# Patient Record
Sex: MALE | Race: WHITE | NOT HISPANIC OR LATINO | Employment: OTHER | URBAN - METROPOLITAN AREA
[De-identification: names, ages, dates, MRNs, and addresses within clinical notes are randomized per-mention and may not be internally consistent; named-entity substitution may affect disease eponyms.]

---

## 2017-01-27 ENCOUNTER — OFFICE VISIT (OUTPATIENT)
Dept: DERMATOLOGY | Facility: CLINIC | Age: 82
End: 2017-01-27
Payer: MEDICARE

## 2017-01-27 VITALS — BODY MASS INDEX: 30.92 KG/M2 | WEIGHT: 216 LBS | HEIGHT: 70 IN

## 2017-01-27 DIAGNOSIS — L21.9 SEBORRHEIC DERMATITIS: ICD-10-CM

## 2017-01-27 DIAGNOSIS — L72.0 MILIA: ICD-10-CM

## 2017-01-27 DIAGNOSIS — Z85.820 HISTORY OF MELANOMA: ICD-10-CM

## 2017-01-27 DIAGNOSIS — L57.0 ACTINIC KERATOSES: Primary | ICD-10-CM

## 2017-01-27 DIAGNOSIS — L82.1 SEBORRHEIC KERATOSES: ICD-10-CM

## 2017-01-27 DIAGNOSIS — Z85.828 HISTORY OF SKIN CANCER: ICD-10-CM

## 2017-01-27 DIAGNOSIS — L21.9 SEBORRHEIC DERMATITIS OF SCALP: ICD-10-CM

## 2017-01-27 PROCEDURE — 99999 PR PBB SHADOW E&M-EST. PATIENT-LVL II: CPT | Mod: PBBFAC,,, | Performed by: DERMATOLOGY

## 2017-01-27 PROCEDURE — 1159F MED LIST DOCD IN RCRD: CPT | Mod: S$GLB,,, | Performed by: DERMATOLOGY

## 2017-01-27 PROCEDURE — 1160F RVW MEDS BY RX/DR IN RCRD: CPT | Mod: S$GLB,,, | Performed by: DERMATOLOGY

## 2017-01-27 PROCEDURE — 99214 OFFICE O/P EST MOD 30 MIN: CPT | Mod: 25,S$GLB,, | Performed by: DERMATOLOGY

## 2017-01-27 PROCEDURE — 1126F AMNT PAIN NOTED NONE PRSNT: CPT | Mod: S$GLB,,, | Performed by: DERMATOLOGY

## 2017-01-27 PROCEDURE — 17000 DESTRUCT PREMALG LESION: CPT | Mod: S$GLB,,, | Performed by: DERMATOLOGY

## 2017-01-27 PROCEDURE — 17003 DESTRUCT PREMALG LES 2-14: CPT | Mod: S$GLB,,, | Performed by: DERMATOLOGY

## 2017-01-27 PROCEDURE — 1157F ADVNC CARE PLAN IN RCRD: CPT | Mod: S$GLB,,, | Performed by: DERMATOLOGY

## 2017-01-27 RX ORDER — KETOCONAZOLE 20 MG/G
CREAM TOPICAL
Qty: 60 G | Refills: 3 | Status: SHIPPED | OUTPATIENT
Start: 2017-01-27

## 2017-01-27 RX ORDER — HYDROCORTISONE 25 MG/G
CREAM TOPICAL
Qty: 28 G | Refills: 1 | Status: SHIPPED | OUTPATIENT
Start: 2017-01-27 | End: 2017-08-17

## 2017-01-27 NOTE — PROGRESS NOTES
Subjective:       Patient ID:  Rafa Tompkins is a 81 y.o. male who presents for   Chief Complaint   Patient presents with    Spot     face and head, tender to touch, 6 mo, tx retin A, 0 progress    Skin Check     FBSE     HPI Comments: Patient last seen 7/2016  Here for FU skin check and new complaints  This is a high risk patient here to check for the development of new lesions.    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  Improvement on treatment: no relief        Review of Systems   Constitutional: Negative for fever and chills.   Skin: Positive for activity-related sunscreen use and wears hat. Negative for daily sunscreen use and recent sunburn.        Objective:    Physical Exam   Constitutional: He appears well-developed and well-nourished. No distress.   Neurological: He is alert and oriented to person, place, and time. He is not disoriented.   Psychiatric: He has a normal mood and affect.   Skin:   Areas Examined (abnormalities noted in diagram):   Scalp / Hair Palpated and Inspected  Head / Face Inspection Performed  Neck Inspection Performed  Chest / Axilla Inspection Performed  Abdomen Inspection Performed  Genitals / Buttocks / Groin Inspection Performed  Back Inspection Performed  RUE Inspected  LUE Inspection Performed  RLE Inspected  LLE Inspection Performed  Nails and Digits Inspection Performed                       Diagram Legend     Erythematous scaling macule/papule c/w actinic keratosis       Vascular papule c/w angioma      Pigmented  verrucoid papule/plaque c/w seborrheic keratosis      Yellow umbilicated papule c/w sebaceous hyperplasia      Irregularly shaped tan macule c/w lentigo     1-2 mm smooth white papules consistent with Milia      Movable subcutaneous cyst with punctum c/w epidermal inclusion cyst      Subcutaneous movable cyst c/w pilar cyst      Firm pink to brown papule c/w dermatofibroma      Pedunculated fleshy papule(s) c/w skin tag(s)      Evenly pigmented macule c/w junctional nevus     Mildly variegated pigmented, slightly irregular-bordered macule c/w mildly atypical nevus      Flesh colored to evenly pigmented papule c/w intradermal nevus       Pink pearly papule/plaque c/w basal cell carcinoma      Erythematous hyperkeratotic cursted plaque c/w SCC      Surgical scar with no sign of skin cancer recurrence      Open and closed comedones      Inflammatory papules and pustules      Verrucoid papule consistent consistent with wart     Erythematous eczematous patches and plaques     Dystrophic onycholytic nail with subungual debris c/w onychomycosis     Umbilicated papule    Erythematous-base heme-crusted tan verrucoid plaque consistent with inflamed seborrheic keratosis     Erythematous Silvery Scaling Plaque c/w Psoriasis     See annotation      Assessment / Plan:        Actinic keratoses  Cryosurgery Procedure Note    Verbal consent from the patient is obtained and the patient is aware of the precancerous quality and need for treatment of these lesions. Liquid nitrogen cryosurgery is applied to the 5 actinic keratoses, as detailed in the physical exam, to produce a freeze injury. The patient is aware that blisters may form and is instructed on wound care with gentle cleansing and use of vaseline ointment to keep moist until healed. The patient is supplied a handout on cryosurgery and is instructed to call if lesions do not completely resolve.    Seborrheic keratoses  These are benign inherited growths without a malignant  potential. Reassurance given to patient. No treatment is necessary.     History of non melanoma skin cancer and melanoma  Area of previous melanoma examined. Site well healed with no signs of recurrence.  Total body skin examination performed today including at least 12 points as noted in physical examination. No lesions suspicious for malignancy noted.    Milia, vs giant closed comedones  Linked to testosterone therapy?    Seborrheic dermatitis of scalp, face  Add keto and HC 2.5% cream  Resume keto shampoo BIW    Solar lentigo  This is a benign hyperpigmented sun induced lesion. Daily sun protection will reduce the number of new lesions. Treatment of these benign lesions are considered cosmetic.           Return in about 6 months (around 7/27/2017).

## 2017-08-16 ENCOUNTER — TELEPHONE (OUTPATIENT)
Dept: DERMATOLOGY | Facility: CLINIC | Age: 82
End: 2017-08-16

## 2017-08-16 NOTE — TELEPHONE ENCOUNTER
----- Message from Susie Azar sent at 8/16/2017  3:30 PM CDT -----  Contact: [phone] or [phone]   Patient is requesting a call back from the nurse stated he need to be seen sooner than the appt scheduled in October.    Please call the patient upon request at phone number [phone] or [phone] .

## 2017-08-17 ENCOUNTER — OFFICE VISIT (OUTPATIENT)
Dept: DERMATOLOGY | Facility: CLINIC | Age: 82
End: 2017-08-17
Payer: MEDICARE

## 2017-08-17 VITALS — BODY MASS INDEX: 30.92 KG/M2 | WEIGHT: 216 LBS | HEIGHT: 70 IN

## 2017-08-17 DIAGNOSIS — Z85.828 HISTORY OF SKIN CANCER: ICD-10-CM

## 2017-08-17 DIAGNOSIS — L57.0 ACTINIC KERATOSES: Primary | ICD-10-CM

## 2017-08-17 DIAGNOSIS — Z85.820 HISTORY OF MELANOMA: ICD-10-CM

## 2017-08-17 DIAGNOSIS — L21.9 SEBORRHEIC DERMATITIS: ICD-10-CM

## 2017-08-17 DIAGNOSIS — L82.1 SEBORRHEIC KERATOSES: ICD-10-CM

## 2017-08-17 PROCEDURE — 17003 DESTRUCT PREMALG LES 2-14: CPT | Mod: S$GLB,,, | Performed by: DERMATOLOGY

## 2017-08-17 PROCEDURE — 17000 DESTRUCT PREMALG LESION: CPT | Mod: S$GLB,,, | Performed by: DERMATOLOGY

## 2017-08-17 PROCEDURE — 3008F BODY MASS INDEX DOCD: CPT | Mod: S$GLB,,, | Performed by: DERMATOLOGY

## 2017-08-17 PROCEDURE — 1126F AMNT PAIN NOTED NONE PRSNT: CPT | Mod: S$GLB,,, | Performed by: DERMATOLOGY

## 2017-08-17 PROCEDURE — 99999 PR PBB SHADOW E&M-EST. PATIENT-LVL II: CPT | Mod: PBBFAC,,, | Performed by: DERMATOLOGY

## 2017-08-17 PROCEDURE — 1159F MED LIST DOCD IN RCRD: CPT | Mod: S$GLB,,, | Performed by: DERMATOLOGY

## 2017-08-17 PROCEDURE — 99213 OFFICE O/P EST LOW 20 MIN: CPT | Mod: 25,S$GLB,, | Performed by: DERMATOLOGY

## 2017-08-17 RX ORDER — FLUOROURACIL 50 MG/G
CREAM TOPICAL
Qty: 40 G | Refills: 0 | Status: SHIPPED | OUTPATIENT
Start: 2017-08-17 | End: 2018-03-16

## 2017-08-17 RX ORDER — HYDROCORTISONE 25 MG/G
CREAM TOPICAL
Qty: 28 G | Refills: 1 | Status: SHIPPED | OUTPATIENT
Start: 2017-08-17 | End: 2018-03-16

## 2017-08-17 NOTE — PROGRESS NOTES
Subjective:       Patient ID:  Rafa Tompkins is a 82 y.o. male who presents for   Chief Complaint   Patient presents with    Spot     L cheek     Skin Check     UBSE     Patient last seen 01/2017 with AK's treated with cryo  This is a high risk patient here to check for the development of new lesions.    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, neg bx margins, monitoring  H/o:  Melanoma (C43.9) 2013 right side of eye (def treatment [facility] surgery)  Squamous cell carcinoma (C80.1) 2013 right lower leg    SCC (squamous cell carcinoma) (C44.92) 1995 left temple     in youth        Spot  - Initial  Affected locations: left cheek  Duration: 4 months  Signs and Symptoms: raised, brown.  Severity: mild  Timing: constant  Aggravated by: nothing  Relieving factors/Treatments tried: nothing        Review of Systems   Constitutional: Negative for fever, chills, weight loss, weight gain and night sweats.   Skin: Positive for dry skin, activity-related sunscreen use and wears hat. Negative for daily sunscreen use.   Hematologic/Lymphatic: Bruises/bleeds easily.        Objective:    Physical Exam   Constitutional: He appears well-developed and well-nourished. No distress.   Neurological: He is alert and oriented to person, place, and time. He is not disoriented.   Psychiatric: He has a normal mood and affect.   Skin:   Areas Examined (abnormalities noted in diagram):   Scalp / Hair Palpated and Inspected  Head / Face Inspection Performed  Neck Inspection Performed  Chest / Axilla Inspection Performed  Abdomen Inspection Performed  Genitals / Buttocks / Groin Inspection Performed  Back Inspection Performed  RUE Inspected  LUE Inspection Performed  RLE Inspected  LLE Inspection Performed  Nails and Digits Inspection Performed                       Diagram Legend     Erythematous scaling macule/papule c/w actinic keratosis       Vascular  papule c/w angioma      Pigmented verrucoid papule/plaque c/w seborrheic keratosis      Yellow umbilicated papule c/w sebaceous hyperplasia      Irregularly shaped tan macule c/w lentigo     1-2 mm smooth white papules consistent with Milia      Movable subcutaneous cyst with punctum c/w epidermal inclusion cyst      Subcutaneous movable cyst c/w pilar cyst      Firm pink to brown papule c/w dermatofibroma      Pedunculated fleshy papule(s) c/w skin tag(s)      Evenly pigmented macule c/w junctional nevus     Mildly variegated pigmented, slightly irregular-bordered macule c/w mildly atypical nevus      Flesh colored to evenly pigmented papule c/w intradermal nevus       Pink pearly papule/plaque c/w basal cell carcinoma      Erythematous hyperkeratotic cursted plaque c/w SCC      Surgical scar with no sign of skin cancer recurrence      Open and closed comedones      Inflammatory papules and pustules      Verrucoid papule consistent consistent with wart     Erythematous eczematous patches and plaques     Dystrophic onycholytic nail with subungual debris c/w onychomycosis     Umbilicated papule    Erythematous-base heme-crusted tan verrucoid plaque consistent with inflamed seborrheic keratosis     Erythematous Silvery Scaling Plaque c/w Psoriasis     See annotation      Assessment / Plan:        Actinic keratoses  Extensive sun damage superior scalp, discussed field tx vs PDT  Cryosurgery Procedure Note    Verbal consent from the patient is obtained and the patient is aware of the precancerous quality and need for treatment of these lesions. Liquid nitrogen cryosurgery is applied to the 6 actinic keratoses, as detailed in the physical exam, to produce a freeze injury. The patient is aware that blisters may form and is instructed on wound care with gentle cleansing and use of vaseline ointment to keep moist until healed. The patient is supplied a handout on cryosurgery and is instructed to call if lesions do not  completely resolve.    -     fluorouracil (EFUDEX) 5 % cream; Thin film to superior scalp nightly for 4 weeks  Dispense: 40 g; Refill: 0    Seborrheic dermatitis, scalp/face, large ill defined plaque left sideburn/scalp/temple  Treat aggressively for seb derm, consider biopsy to r/o SCCIS if fails to resolve  -     hydrocortisone 2.5 % cream; Thin film to AA on left face BID PRN flare (use with ketoconazole cream)  Dispense: 28 g; Refill: 1    Seborrheic keratoses  These are benign inherited growths without a malignant potential. Reassurance given to patient. No treatment is necessary.    - stable and chronic    History of skin cancer, History of melanoma (2013)  UBSE today, sites examined, no recurrence    Patient instructed in importance in daily sun protection of at least spf 30. Sun avoidance and topical protection discussed.   Recommend Elta MD (physician office) COTZ sensitive (available online) for daily use on face and neck.  Patient encouraged to wear hat for all outdoor exposure.   Also discussed sun protective clothing.             Return in about 3 months (around 11/17/2017).

## 2017-08-17 NOTE — PATIENT INSTRUCTIONS

## 2017-09-12 ENCOUNTER — LAB VISIT (OUTPATIENT)
Dept: LAB | Facility: HOSPITAL | Age: 82
End: 2017-09-12
Attending: SPECIALIST
Payer: MEDICARE

## 2017-09-12 DIAGNOSIS — E29.1 OTHER TESTICULAR HYPOFUNCTION: ICD-10-CM

## 2017-09-12 DIAGNOSIS — E29.1 OTHER TESTICULAR HYPOFUNCTION: Primary | ICD-10-CM

## 2017-09-12 LAB — TESTOST SERPL-MCNC: 332 NG/DL

## 2017-09-12 PROCEDURE — 84402 ASSAY OF FREE TESTOSTERONE: CPT

## 2017-09-12 PROCEDURE — 36415 COLL VENOUS BLD VENIPUNCTURE: CPT | Mod: PO

## 2017-09-12 PROCEDURE — 84403 ASSAY OF TOTAL TESTOSTERONE: CPT

## 2017-09-15 LAB — TESTOST FREE SERPL-MCNC: 4.7 PG/ML

## 2017-11-14 ENCOUNTER — TELEPHONE (OUTPATIENT)
Dept: CARDIOLOGY | Facility: CLINIC | Age: 82
End: 2017-11-14

## 2017-11-14 NOTE — TELEPHONE ENCOUNTER
Spoke to patient's spouse.  Spouse stated that her  is having swelling in his left leg with discoloration.  Also patient is having dyspnea on exertion.   patient was put on the phone. Patient stated that he thinks his wife is exaggerating.  Patient did state that he is having some shortness of breath and one leg is more swollen than the other.  Encouraged patient to go to the Emergency department.  patient stated he did not want to go.  Encouraged patient to at least call his PCP DR Garcia with [facility].    Patient agreed to make an appointment with DR. Olsen to establish cardiac care.  Will mail appointment letter .

## 2017-11-20 ENCOUNTER — TELEPHONE (OUTPATIENT)
Dept: CARDIOLOGY | Facility: CLINIC | Age: 82
End: 2017-11-20

## 2017-11-20 NOTE — TELEPHONE ENCOUNTER
Returned Mr. Tompkins's call to find out what lab work he is talking about. Ne answer, left message. Will continue to call.

## 2017-11-20 NOTE — TELEPHONE ENCOUNTER
----- Message from Kanika Sanchez sent at 11/20/2017  8:08 AM CST -----  Pt is taking coumadin .. Asking if Dr Olsen will be monitoring his labs  / was advised to have a lab on Tuesday 11/21 st / call [phone] (home)

## 2017-11-21 NOTE — TELEPHONE ENCOUNTER
Called Mr. Tompkins, his son asnwered and stated he just left for Spiritism. He stated he would have him call back when he got home. No further issues noted.

## 2017-12-06 ENCOUNTER — TELEPHONE (OUTPATIENT)
Dept: CARDIOLOGY | Facility: CLINIC | Age: 82
End: 2017-12-06

## 2017-12-06 NOTE — TELEPHONE ENCOUNTER
Spoke to patient he stated that Fatuma had returned his call questioning who and what woould he go to for coumadin blood work.  Patient currently with Dr Garcia  In [facility] ER.  Explained to patient that he will need to continue his appointment with Dr Garcia's office.  When patient comes to see Dr Olsen will be referred to the [facility] coumadin clinic.  Patient stated that he understands and agrees.    t

## 2017-12-06 NOTE — TELEPHONE ENCOUNTER
----- Message from Mechelle Correa sent at 12/6/2017  9:08 AM CST -----  Patient states he is returning a call from office please call [phone] (ojrk)

## 2017-12-15 ENCOUNTER — OFFICE VISIT (OUTPATIENT)
Dept: CARDIOLOGY | Facility: CLINIC | Age: 82
End: 2017-12-15
Payer: MEDICARE

## 2017-12-15 VITALS
WEIGHT: 231.06 LBS | HEIGHT: 70 IN | DIASTOLIC BLOOD PRESSURE: 71 MMHG | BODY MASS INDEX: 33.08 KG/M2 | HEART RATE: 64 BPM | OXYGEN SATURATION: 96 % | SYSTOLIC BLOOD PRESSURE: 123 MMHG

## 2017-12-15 DIAGNOSIS — I35.0 NONRHEUMATIC AORTIC VALVE STENOSIS: ICD-10-CM

## 2017-12-15 DIAGNOSIS — Z76.89 ENCOUNTER TO ESTABLISH CARE: ICD-10-CM

## 2017-12-15 DIAGNOSIS — I82.402 ACUTE DEEP VEIN THROMBOSIS (DVT) OF LEFT LOWER EXTREMITY, UNSPECIFIED VEIN: ICD-10-CM

## 2017-12-15 DIAGNOSIS — I26.99 OTHER ACUTE PULMONARY EMBOLISM WITHOUT ACUTE COR PULMONALE: ICD-10-CM

## 2017-12-15 DIAGNOSIS — Z76.89 ENCOUNTER TO ESTABLISH CARE: Primary | ICD-10-CM

## 2017-12-15 PROBLEM — I82.409 DEEP VEIN THROMBOSIS: Status: ACTIVE | Noted: 2017-12-15

## 2017-12-15 PROCEDURE — 99204 OFFICE O/P NEW MOD 45 MIN: CPT | Mod: S$GLB,,, | Performed by: INTERNAL MEDICINE

## 2017-12-15 PROCEDURE — 99999 PR PBB SHADOW E&M-EST. PATIENT-LVL III: CPT | Mod: PBBFAC,,, | Performed by: INTERNAL MEDICINE

## 2017-12-15 PROCEDURE — 93000 ELECTROCARDIOGRAM COMPLETE: CPT | Mod: S$GLB,,, | Performed by: INTERNAL MEDICINE

## 2017-12-15 RX ORDER — WARFARIN SODIUM 5 MG/1
TABLET ORAL
Refills: 0 | COMMUNITY
Start: 2017-11-16 | End: 2018-03-16

## 2017-12-15 RX ORDER — WARFARIN 4 MG/1
4 TABLET ORAL
Refills: 0 | COMMUNITY
Start: 2017-11-21 | End: 2020-01-22

## 2017-12-15 NOTE — PROGRESS NOTES
"[facility]    CC: Shortness of breath  Chief Complaint   Patient presents with    [facility]       Patient ID: Rafa Tompkins is a 82 y.o. male with a past medical history of DVT, pulmonary embolism     HPI  This gentleman was recently admitted to the hospital after a diagnosis of DVT and pulmonary embolism.  He underwent extensive evaluation and was discharged on anticoagulation.  During this hospital stay at [facility] he also had an echocardiography which showed severe aortic stenosis based on mean gradient of 44, aortic valve area of 0.8 cm².  He usually is not short of breath unless he tries to exert himself.. Denies syncope, presyncope.    Past Medical History:   Diagnosis Date    Glaucoma     Hypertension     Melanoma 2013    right side of eye    Melanoma in situ     SCC (squamous cell carcinoma) 1995    left temple    Squamous cell carcinoma 2013    right lower leg     Past Surgical History:   Procedure Laterality Date    HIP SURGERY       Social History     Social History    Marital status:      Spouse name: N/A    Number of children: N/A    Years of education: N/A     Occupational History    Not on file.     Social History Main Topics    Smoking status: Former Smoker     Types: Pipe     Quit date: 1/27/2014    Smokeless tobacco: Never Used    Alcohol use 0.6 oz/week     1 Glasses of wine per week      Comment: 1 drink a day    Drug use: No    Sexual activity: Yes     Partners: Female     Other Topics Concern    Not on file     Social History Narrative    No narrative on file     Family History   Problem Relation Age of Onset    Melanoma Neg Hx     Psoriasis Neg Hx     Lupus Neg Hx     Eczema Neg Hx        Review of patient's allergies indicates:   Allergen Reactions    Penicillins        Medication List with Changes/Refills   Current Medications    BD LUER-GEOFFREY SYRINGE 3 ML 20 X 1" SYRG        CALCITRIOL (ROCALTROL) 0.25 MCG CAP    TK ONE C " "PO  Q WEEK    DOXAZOSIN (CARDURA) 8 MG TABLET    Take 4 mg by mouth every evening.     FINASTERIDE (PROSCAR) 5 MG TABLET        FLUOROURACIL (EFUDEX) 5 % CREAM    Thin film to superior scalp nightly for 4 weeks    HYDROCORTISONE 2.5 % CREAM    Thin film to AA on left face BID PRN flare (use with ketoconazole cream)    KETOCONAZOLE (NIZORAL) 2 % CREAM    Apply to itchy red scaly areas of the face twice daily    KETOCONAZOLE (NIZORAL) 2 % SHAMPOO    Wash hair with medicated shampoo at least 2x/week - let sit on scalp at least 5 minutes prior to rinsing    LISINOPRIL (PRINIVIL,ZESTRIL) 2.5 MG TABLET        SIMVASTATIN (ZOCOR) 40 MG TABLET    Take 40 mg by mouth every evening.      TESTOSTERONE CYPIONATE (DEPOTESTOTERONE CYPIONATE) 200 MG/ML INJECTION        TIMOLOL MALEATE 0.5% (TIMOPTIC) 0.5 % DROP        TRAVOPROST, BENZALKONIUM, (TRAVATAN) 0.004 % OPHTHALMIC SOLUTION    Place 1 drop into both eyes nightly.      TRETINOIN (RETIN-A TOP)    Apply topically.    WARFARIN (COUMADIN) 4 MG TABLET    Take 4 mg by mouth.    WARFARIN (COUMADIN) 5 MG TABLET           Review of Systems  Constitution: Denies chills, fever, and sweats.  HENT: Denies headaches or blurry vision.  Cardiovascular: Denies chest pain or irregular heart beat.  Respiratory: Denies cough or shortness of breath.  Gastrointestinal: Denies abdominal pain, nausea, or vomiting.  Musculoskeletal: Denies muscle cramps.  Neurological: Denies dizziness or focal weakness.  Psychiatric/Behavioral: Normal mental status.  Hematologic/Lymphatic: Denies bleeding problem or easy bruising/bleeding.  Skin: Denies rash or suspicious lesions    Physical Examination  /71 (BP Location: Left arm)   Pulse 64   Ht 5' 10" (1.778 m)   Wt 104.8 kg (231 lb 0.7 oz)   SpO2 96%   BMI 33.15 kg/m²     Constitutional: No acute distress, conversant  HEENT: Sclera anicteric, Pupils equal, round and reactive to light, extraocular motions intact, Oropharynx clear  Neck: No JVD, no " carotid bruits  Cardiovascular: regular rate and rhythm, esm murmur, rubs or gallops, normal S1/S2  Pulmonary: Clear to auscultation bilaterally  Abdominal: Abdomen soft, nontender, nondistended, positive bowel sounds  Extremities: Left extremity edema,   Pulses:  Carotid pulses are 2+ on the right side, and 2+ on the left side.  Radial pulses are 2+ on the right side, and 2+ on the left side.      Skin: No ecchymosis, erythema, or ulcers  Psych: Alert and oriented x 3, appropriate affect  Neuro: CNII-XII intact, no focal deficits    Labs:  Most Recent Data  CBC:   Lab Results   Component Value Date    WBC 7.90 10/12/2014    HGB 15.2 10/12/2014    HCT 44.7 10/12/2014     (L) 10/12/2014    MCV 93 10/12/2014    RDW 14.0 10/12/2014     BMP:   Lab Results   Component Value Date     10/12/2014    K 4.2 10/12/2014     10/12/2014    CO2 20 (L) 10/12/2014    BUN 21 10/12/2014    CREATININE 1.6 (H) 10/12/2014     (H) 10/12/2014    CALCIUM 8.7 10/12/2014    MG 2.2 04/07/2012    PHOS 3.0 04/07/2012     LFTS;   Lab Results   Component Value Date    PROT 6.8 10/12/2014    ALBUMIN 3.4 (L) 10/12/2014    BILITOT 1.1 (H) 10/12/2014    AST 20 10/12/2014    ALKPHOS 65 10/12/2014    ALT 15 10/12/2014     COAGS:   Lab Results   Component Value Date    INR 1.1 03/30/2012     FLP: No results found for: CHOL, HDL, LDLCALC, TRIG, CHOLHDL  CARDIAC:   Lab Results   Component Value Date     (H) 04/07/2012       Imaging:    EKG: Normal sinus rhythm.  inComplete right bundle-branch block     Echo: Mean gradient of 44, valve area 0.8 cm² peak gradient more than 70. normal Ejection fraction      I have personally reviewed these images and echo data    Assessment/Plan:  Rafa was seen today for establish care.    Diagnoses and all orders for this visit:    Encounter to establish care  -     EKG 12-lead    Nonrheumatic aortic valve stenosis    Acute deep vein thrombosis (DVT) of left lower extremity, unspecified  vein    Other acute pulmonary embolism without acute cor pulmonale    Return in about 4 months (around 4/15/2018).    Currently the deep vein thrombosis and the pulmonary embolism is fresh hence I do not want to do any interruption of anticoagulation.  I will see him back in few months time and then schedule him for a left and right heart catheterization for assessment of the aortic stenosis.      Total duration of face to face visit time 45 minutes.  Total time spent counseling greater than fifty percent of total visit time.  Counseling included discussion regarding imaging findings, diagnosis, possibilities, treatment options, risks and benefits.  The patient had many questions regarding the options and long-term effect which were all answered to my best ability.      Sergio Olsen MD,MRCP,RPVI,FACC,FSCAI.  Interventional Cardiology   Phone [phone]

## 2018-03-16 ENCOUNTER — OFFICE VISIT (OUTPATIENT)
Dept: DERMATOLOGY | Facility: CLINIC | Age: 83
End: 2018-03-16
Payer: MEDICARE

## 2018-03-16 VITALS — WEIGHT: 231 LBS | HEIGHT: 70 IN | BODY MASS INDEX: 33.07 KG/M2

## 2018-03-16 DIAGNOSIS — L57.0 ACTINIC KERATOSES: ICD-10-CM

## 2018-03-16 DIAGNOSIS — L72.0 MILIA: ICD-10-CM

## 2018-03-16 DIAGNOSIS — D48.9 NEOPLASM OF UNCERTAIN BEHAVIOR: Primary | ICD-10-CM

## 2018-03-16 DIAGNOSIS — L81.4 SOLAR LENTIGO: ICD-10-CM

## 2018-03-16 DIAGNOSIS — Z85.828 HISTORY OF SKIN CANCER: ICD-10-CM

## 2018-03-16 DIAGNOSIS — L82.1 SEBORRHEIC KERATOSES: ICD-10-CM

## 2018-03-16 PROCEDURE — 99213 OFFICE O/P EST LOW 20 MIN: CPT | Mod: 25,S$GLB,, | Performed by: DERMATOLOGY

## 2018-03-16 PROCEDURE — 88305 TISSUE EXAM BY PATHOLOGIST: CPT | Mod: 59 | Performed by: PATHOLOGY

## 2018-03-16 PROCEDURE — 11101 PR BIOPSY, EACH ADDED LESION: CPT | Mod: S$GLB,,, | Performed by: DERMATOLOGY

## 2018-03-16 PROCEDURE — 11100 PR BIOPSY OF SKIN LESION: CPT | Mod: 59,S$GLB,, | Performed by: DERMATOLOGY

## 2018-03-16 PROCEDURE — 17000 DESTRUCT PREMALG LESION: CPT | Mod: S$GLB,,, | Performed by: DERMATOLOGY

## 2018-03-16 PROCEDURE — 99999 PR PBB SHADOW E&M-EST. PATIENT-LVL III: CPT | Mod: PBBFAC,,, | Performed by: DERMATOLOGY

## 2018-03-16 PROCEDURE — 17003 DESTRUCT PREMALG LES 2-14: CPT | Mod: S$GLB,,, | Performed by: DERMATOLOGY

## 2018-03-16 RX ORDER — WARFARIN 3 MG/1
3 TABLET ORAL
Refills: 0 | COMMUNITY
Start: 2018-02-21 | End: 2018-09-17

## 2018-03-16 NOTE — PATIENT INSTRUCTIONS
Shave Biopsy Wound Care    Your doctor has performed a shave biopsy today.  A band aid and vaseline ointment has been placed over the site.  This should remain in place for 24 hours.  It is recommended that you keep the area dry for the first 24 hours.  After 24 hours, you may remove the band aid and wash the area with warm soap and water and apply Vaseline jelly.  Many patients prefer to use Neosporin or Bacitracin ointment.  This is acceptable; however, know that you can develop an allergy to this medication even if you have used it safely for years.  It is important to keep the area moist.  Letting it dry out and get air slows healing time, and will worsen the scar.  Band aid is optional after first 24 hours.      If you notice increasing redness, tenderness, pain, or yellow drainage at the biopsy site, please notify your doctor.  These are signs of an infection.    If your biopsy site is bleeding, apply firm pressure for 15 minutes straight.  Repeat for another 15 minutes, if it is still bleeding.   If the surgical site continues to bleed, then please contact your doctor.       [facility]  [facility]  [address]  [place] [address]  Dept: [phone]       CRYOSURGERY      Your doctor has used a method called cryosurgery to treat your skin condition. Cryosurgery refers to the use of very cold substances to treat a variety of skin conditions such as warts, pre-skin cancers, molluscum contagiosum, sun spots, and several benign growths. The substance we use in cryosurgery is liquid nitrogen and is so cold (-195 degrees Celsius) that is burns when administered.     Following treatment in the office, the skin may immediately burn and become red. You may find the area around the lesion is affected as well. It is sometimes necessary to treat not only the lesion, but a small area of the surrounding normal skin to achieve a good response.     A blister, and even a blood filled blister,  may form after treatment.   This is a normal response. If the blister is painful, it is acceptable to sterilize a needle and with rubbing alcohol and gently pop the blister. It is important that you gently wash the area with soap and warm water as the blister fluid may contain wart virus if a wart was treated. Do no remove the roof of the blister.     The area treated can take anywhere from 1-3 weeks to heal. Healing time depends on the kind skin lesion treated, the location, and how aggressively the lesion was treated. It is recommended that the areas treated are covered with Vaseline or bacitracin ointment and a band-aid. If a band-aid is not practical, just ointment applied several times per day will do. Keeping these areas moist will speed the healing time.    Treatment with liquid nitrogen can leave a scar. In dark skin, it may be a light or dark scar, in light skin it may be a white or pink scar. These will generally fade with time, but may never go away completely.     If you have any concerns after your treatment, please feel free to call the office.         [facility]  [facility]  [address] [place] [address]  Dept: [phone]

## 2018-03-16 NOTE — PROGRESS NOTES
Subjective:       Patient ID:  Rafa Tompkins is a 83 y.o. male who presents for   Chief Complaint   Patient presents with    Skin Check     UBSE    Lesion     L arm     Patient last seen 8/2017 with AKs treated with cryo  This is a high risk patient here to check for the development of new lesions.    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, neg bx margins, monitoring  H/o:  Melanoma (C43.9) 2013 right side of eye (def treatment [facility] surgery)  Squamous cell carcinoma (C80.1) 2013 right lower leg    SCC (squamous cell carcinoma) (C44.92) 1995 left temple     in youth        Lesion  - Initial  Affected locations: left arm  Duration: 6 months  Signs / symptoms: scaling (raised)  Severity: mild  Timing: constant  Aggravated by: picking  Relieving factors/Treatments tried: OTC wart treatment  Improvement on treatment: no relief        Review of Systems   Constitutional: Negative for fever, chills, weight loss, weight gain, fatigue, night sweats and malaise.   Skin: Positive for dry skin, activity-related sunscreen use and wears hat. Negative for daily sunscreen use.   Hematologic/Lymphatic: Bruises/bleeds easily.        Objective:    Physical Exam   Constitutional: He appears well-developed and well-nourished. No distress.   Neurological: He is alert and oriented to person, place, and time. He is not disoriented.   Psychiatric: He has a normal mood and affect.   Skin:   Areas Examined (abnormalities noted in diagram):   Scalp / Hair Palpated and Inspected  Head / Face Inspection Performed  Neck Inspection Performed  Chest / Axilla Inspection Performed  Abdomen Inspection Performed  Back Inspection Performed  RUE Inspected  LUE Inspection Performed  Nails and Digits Inspection Performed                       Diagram Legend     Erythematous scaling macule/papule c/w actinic keratosis       Vascular papule c/w angioma       Pigmented verrucoid papule/plaque c/w seborrheic keratosis      Yellow umbilicated papule c/w sebaceous hyperplasia      Irregularly shaped tan macule c/w lentigo     1-2 mm smooth white papules consistent with Milia      Movable subcutaneous cyst with punctum c/w epidermal inclusion cyst      Subcutaneous movable cyst c/w pilar cyst      Firm pink to brown papule c/w dermatofibroma      Pedunculated fleshy papule(s) c/w skin tag(s)      Evenly pigmented macule c/w junctional nevus     Mildly variegated pigmented, slightly irregular-bordered macule c/w mildly atypical nevus      Flesh colored to evenly pigmented papule c/w intradermal nevus       Pink pearly papule/plaque c/w basal cell carcinoma      Erythematous hyperkeratotic cursted plaque c/w SCC      Surgical scar with no sign of skin cancer recurrence      Open and closed comedones      Inflammatory papules and pustules      Verrucoid papule consistent consistent with wart     Erythematous eczematous patches and plaques     Dystrophic onycholytic nail with subungual debris c/w onychomycosis     Umbilicated papule    Erythematous-base heme-crusted tan verrucoid plaque consistent with inflamed seborrheic keratosis     Erythematous Silvery Scaling Plaque c/w Psoriasis     See annotation              Assessment / Plan:      Pathology Orders:     Normal Orders This Visit    Tissue Specimen To Pathology, Dermatology     Questions:    Directional Terms:  Other(comment)    Clinical information:  1/2- left neck, inflamed warty papule, ISK vs SCC 2/2- left forearm, tender warty papule, ISK vs SCC    Specific Site:  1/2- left neck 2/2- left forearm,        Neoplasm of uncertain behavior  -     Tissue Specimen To Pathology, Dermatology  Shave biopsy procedure note:x2    Shave biopsy performed after verbal consent including risk of infection, scar, recurrence, need for additional treatment of site. Area prepped with alcohol, anesthetized with approximately 1.0cc of 1%  lidocaine with epinephrine. Lesional tissue shaved with razor blade. Hemostasis achieved with application of aluminum chloride followed by hyfrecation. No complications. Dressing applied. Wound care explained.      Actinic keratoses  Cryosurgery Procedure Note    Verbal consent from the patient is obtained and the patient is aware of the precancerous quality and need for treatment of these lesions. Liquid nitrogen cryosurgery is applied to the 3 actinic keratoses, as detailed in the physical exam, to produce a freeze injury. The patient is aware that blisters may form and is instructed on wound care with gentle cleansing and use of vaseline ointment to keep moist until healed. The patient is supplied a handout on cryosurgery and is instructed to call if lesions do not completely resolve.    Seborrheic keratoses  These are benign inherited growths without a malignant potential. Reassurance given to patient. No treatment is necessary.     Solar lentigo  This is a benign hyperpigmented sun induced lesion. Daily sun protection will reduce the number of new lesions. Treatment of these benign lesions are considered cosmetic.    Milia, vs giant closed comedones  Linked to testosterone therapy?    History of skin cancer and MIS R lateral canthus  Area of previous NMSC (multiple) examined. Site well healed with no signs of recurrence.  Upper body skin examination performed today including at least 9 points as noted in physical examination. No lesions suspicious for malignancy noted.    Patient instructed in importance in daily sun protection of at least spf 30. Sun avoidance and topical protection discussed.   Recommend Elta MD (physician office) COTZ sensitive (available online) for daily use on face and neck.  Patient encouraged to wear hat for all outdoor exposure.   Also discussed sun protective clothing.           No Follow-up on file.

## 2018-09-17 ENCOUNTER — OFFICE VISIT (OUTPATIENT)
Dept: DERMATOLOGY | Facility: CLINIC | Age: 83
End: 2018-09-17
Payer: MEDICARE

## 2018-09-17 DIAGNOSIS — L81.4 SOLAR LENTIGO: ICD-10-CM

## 2018-09-17 DIAGNOSIS — L82.1 SEBORRHEIC KERATOSES: ICD-10-CM

## 2018-09-17 DIAGNOSIS — D48.9 NEOPLASM OF UNCERTAIN BEHAVIOR: Primary | ICD-10-CM

## 2018-09-17 DIAGNOSIS — L57.0 ACTINIC KERATOSES: ICD-10-CM

## 2018-09-17 DIAGNOSIS — Z85.828 HISTORY OF NONMELANOMA SKIN CANCER: ICD-10-CM

## 2018-09-17 DIAGNOSIS — L72.0 MILIA: ICD-10-CM

## 2018-09-17 DIAGNOSIS — Z85.820 HISTORY OF MELANOMA: ICD-10-CM

## 2018-09-17 PROCEDURE — 1101F PT FALLS ASSESS-DOCD LE1/YR: CPT | Mod: CPTII,,, | Performed by: DERMATOLOGY

## 2018-09-17 PROCEDURE — 88342 IMHCHEM/IMCYTCHM 1ST ANTB: CPT | Performed by: PATHOLOGY

## 2018-09-17 PROCEDURE — 11101 PR BIOPSY, EACH ADDED LESION: CPT | Mod: PBBFAC,PO | Performed by: DERMATOLOGY

## 2018-09-17 PROCEDURE — 17003 DESTRUCT PREMALG LES 2-14: CPT | Mod: PBBFAC,PO | Performed by: DERMATOLOGY

## 2018-09-17 PROCEDURE — 88305 TISSUE EXAM BY PATHOLOGIST: CPT | Mod: 59 | Performed by: PATHOLOGY

## 2018-09-17 PROCEDURE — 99999 PR PBB SHADOW E&M-EST. PATIENT-LVL III: CPT | Mod: PBBFAC,,, | Performed by: DERMATOLOGY

## 2018-09-17 PROCEDURE — 11100 PR BIOPSY OF SKIN LESION: CPT | Mod: 59,PBBFAC,PO | Performed by: DERMATOLOGY

## 2018-09-17 PROCEDURE — 11101 PR BIOPSY, EACH ADDED LESION: CPT | Mod: S$PBB,,, | Performed by: DERMATOLOGY

## 2018-09-17 PROCEDURE — 99213 OFFICE O/P EST LOW 20 MIN: CPT | Mod: 25,S$PBB,, | Performed by: DERMATOLOGY

## 2018-09-17 PROCEDURE — 88341 IMHCHEM/IMCYTCHM EA ADD ANTB: CPT | Mod: 26,,, | Performed by: PATHOLOGY

## 2018-09-17 PROCEDURE — 88305 TISSUE EXAM BY PATHOLOGIST: CPT | Mod: 26,,, | Performed by: PATHOLOGY

## 2018-09-17 PROCEDURE — 11100 PR BIOPSY OF SKIN LESION: CPT | Mod: 59,S$PBB,, | Performed by: DERMATOLOGY

## 2018-09-17 PROCEDURE — 17000 DESTRUCT PREMALG LESION: CPT | Mod: PBBFAC,PO | Performed by: DERMATOLOGY

## 2018-09-17 PROCEDURE — 99213 OFFICE O/P EST LOW 20 MIN: CPT | Mod: PBBFAC,PO,25 | Performed by: DERMATOLOGY

## 2018-09-17 PROCEDURE — 17000 DESTRUCT PREMALG LESION: CPT | Mod: S$PBB,,, | Performed by: DERMATOLOGY

## 2018-09-17 PROCEDURE — 88342 IMHCHEM/IMCYTCHM 1ST ANTB: CPT | Mod: 26,,, | Performed by: PATHOLOGY

## 2018-09-17 PROCEDURE — 17003 DESTRUCT PREMALG LES 2-14: CPT | Mod: S$PBB,,, | Performed by: DERMATOLOGY

## 2018-09-17 NOTE — PATIENT INSTRUCTIONS
Shave Biopsy Wound Care    Your doctor has performed a shave biopsy today.  A band aid and vaseline ointment has been placed over the site.  This should remain in place for 24 hours.  It is recommended that you keep the area dry for the first 24 hours.  After 24 hours, you may remove the band aid and wash the area with warm soap and water and apply Vaseline jelly.  Many patients prefer to use Neosporin or Bacitracin ointment.  This is acceptable; however, know that you can develop an allergy to this medication even if you have used it safely for years.  It is important to keep the area moist.  Letting it dry out and get air slows healing time, and will worsen the scar.  Band aid is optional after first 24 hours.      If you notice increasing redness, tenderness, pain, or yellow drainage at the biopsy site, please notify your doctor.  These are signs of an infection.    If your biopsy site is bleeding, apply firm pressure for 15 minutes straight.  Repeat for another 15 minutes, if it is still bleeding.   If the surgical site continues to bleed, then please contact your doctor.       [facility]  [facility]  [address]  [place] [address]  Dept: [phone]       CRYOSURGERY      Your doctor has used a method called cryosurgery to treat your skin condition. Cryosurgery refers to the use of very cold substances to treat a variety of skin conditions such as warts, pre-skin cancers, molluscum contagiosum, sun spots, and several benign growths. The substance we use in cryosurgery is liquid nitrogen and is so cold (-195 degrees Celsius) that is burns when administered.     Following treatment in the office, the skin may immediately burn and become red. You may find the area around the lesion is affected as well. It is sometimes necessary to treat not only the lesion, but a small area of the surrounding normal skin to achieve a good response.     A blister, and even a blood filled blister,  may form after treatment.   This is a normal response. If the blister is painful, it is acceptable to sterilize a needle and with rubbing alcohol and gently pop the blister. It is important that you gently wash the area with soap and warm water as the blister fluid may contain wart virus if a wart was treated. Do no remove the roof of the blister.     The area treated can take anywhere from 1-3 weeks to heal. Healing time depends on the kind skin lesion treated, the location, and how aggressively the lesion was treated. It is recommended that the areas treated are covered with Vaseline or bacitracin ointment and a band-aid. If a band-aid is not practical, just ointment applied several times per day will do. Keeping these areas moist will speed the healing time.    Treatment with liquid nitrogen can leave a scar. In dark skin, it may be a light or dark scar, in light skin it may be a white or pink scar. These will generally fade with time, but may never go away completely.     If you have any concerns after your treatment, please feel free to call the office.         [facility]  [facility]  [address] [place] [address]  Dept: [phone]

## 2018-09-17 NOTE — PROGRESS NOTES
"  Subjective:       Patient ID:  Rafa Tompkins is a 83 y.o. male who presents for   Chief Complaint   Patient presents with    Skin Check     6 month UBSE    Spot     L cheek     Patient last seen 03/2018 with AKs treated with cryo and bx of small SCCIS left neck, neg biopsy margins, monitoring    This is a high risk patient here to check for the development of new lesions.  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, neg bx margins, monitoring  Melanoma (C43.9) 2013 right side of eye (def treatment [facility] surgery)  Squamous cell carcinoma (C80.1) 2013 right lower leg    SCC (squamous cell carcinoma) (C44.92) 1995 left temple     in youth      Current Outpatient Medications:     BD LUER-GEOFFREY SYRINGE 3 mL 20 x 1" Syrg, , Disp: , Rfl: 5    calcitRIOL (ROCALTROL) 0.25 MCG Cap, TK ONE C PO  Q WEEK, Disp: , Rfl: 4    finasteride (PROSCAR) 5 mg tablet, , Disp: , Rfl: 2    ketoconazole (NIZORAL) 2 % cream, Apply to itchy red scaly areas of the face twice daily, Disp: 60 g, Rfl: 3    ketoconazole (NIZORAL) 2 % shampoo, Wash hair with medicated shampoo at least 2x/week - let sit on scalp at least 5 minutes prior to rinsing, Disp: 120 mL, Rfl: 5    lisinopril (PRINIVIL,ZESTRIL) 2.5 MG tablet, , Disp: , Rfl: 3    simvastatin (ZOCOR) 40 MG tablet, Take 40 mg by mouth every evening.  , Disp: , Rfl:     testosterone cypionate (DEPOTESTOTERONE CYPIONATE) 200 mg/mL injection, every 28 days. , Disp: , Rfl: 2    timolol maleate 0.5% (TIMOPTIC) 0.5 % Drop, , Disp: , Rfl: 1    TRETINOIN (RETIN-A TOP), Apply topically., Disp: , Rfl:     warfarin (COUMADIN) 4 MG tablet, Take 4 mg by mouth., Disp: , Rfl: 0        Spot  - Initial  Affected locations: left cheek  Duration: 6 months  Signs and Symptoms: darker.  Severity: mild  Timing: constant  Aggravated by: nothing  Relieving factors/Treatments tried: nothing        Review " of Systems   Constitutional: Negative for fever, chills, weight loss, weight gain, fatigue, night sweats and malaise.   Skin: Positive for dry skin, activity-related sunscreen use and wears hat. Negative for daily sunscreen use.   Hematologic/Lymphatic: Bruises/bleeds easily (coumadin).        Objective:    Physical Exam   Constitutional: He appears well-developed and well-nourished. No distress.   Neurological: He is alert and oriented to person, place, and time. He is not disoriented.   Psychiatric: He has a normal mood and affect.   Skin:   Areas Examined (abnormalities noted in diagram):   Scalp / Hair Palpated and Inspected  Head / Face Inspection Performed  Neck Inspection Performed  Chest / Axilla Inspection Performed  Abdomen Inspection Performed  Back Inspection Performed  RUE Inspected  LUE Inspection Performed  Nails and Digits Inspection Performed                       Diagram Legend     Erythematous scaling macule/papule c/w actinic keratosis       Vascular papule c/w angioma      Pigmented verrucoid papule/plaque c/w seborrheic keratosis      Yellow umbilicated papule c/w sebaceous hyperplasia      Irregularly shaped tan macule c/w lentigo     1-2 mm smooth white papules consistent with Milia      Movable subcutaneous cyst with punctum c/w epidermal inclusion cyst      Subcutaneous movable cyst c/w pilar cyst      Firm pink to brown papule c/w dermatofibroma      Pedunculated fleshy papule(s) c/w skin tag(s)      Evenly pigmented macule c/w junctional nevus     Mildly variegated pigmented, slightly irregular-bordered macule c/w mildly atypical nevus      Flesh colored to evenly pigmented papule c/w intradermal nevus       Pink pearly papule/plaque c/w basal cell carcinoma      Erythematous hyperkeratotic cursted plaque c/w SCC      Surgical scar with no sign of skin cancer recurrence      Open and closed comedones      Inflammatory papules and pustules      Verrucoid papule consistent consistent with  wart     Erythematous eczematous patches and plaques     Dystrophic onycholytic nail with subungual debris c/w onychomycosis     Umbilicated papule    Erythematous-base heme-crusted tan verrucoid plaque consistent with inflamed seborrheic keratosis     Erythematous Silvery Scaling Plaque c/w Psoriasis     See annotation              Assessment / Plan:      Pathology Orders:     Normal Orders This Visit    Tissue Specimen To Pathology, Dermatology     Questions:    Directional Terms:  Other(comment)    Clinical information:  BOTH pink pearly papules, r/o BCC    Specific Site:  1/2- left upper arm, R upper back,        Neoplasm of uncertain behavior  -     Tissue Specimen To Pathology, Dermatology  Shave biopsy procedure note:x2    Shave biopsy performed after verbal consent including risk of infection, scar, recurrence, need for additional treatment of site. Area prepped with alcohol, anesthetized with approximately 1.0cc of 1% lidocaine with epinephrine. Lesional tissue shaved with razor blade. Hemostasis achieved with application of aluminum chloride followed by hyfrecation. No complications. Dressing applied. Wound care explained.    Actinic keratoses  Cryosurgery Procedure Note    Verbal consent from the patient is obtained and the patient is aware of the precancerous quality and need for treatment of these lesions. Liquid nitrogen cryosurgery is applied to the 2 actinic keratoses, as detailed in the physical exam, to produce a freeze injury. The patient is aware that blisters may form and is instructed on wound care with gentle cleansing and use of vaseline ointment to keep moist until healed. The patient is supplied a handout on cryosurgery and is instructed to call if lesions do not completely resolve.    History of nonmelanoma skin cancer, History of melanoma  Area of previous NMSC (multiple) examined. Site well healed with no signs of recurrence.  Upper body skin examination performed today including at  least 9 points as noted in physical examination.     Seborrheic keratoses  These are benign inherited growths without a malignant potential. Reassurance given to patient. No treatment is necessary.    - stable and chronic    Solar lentigo  This is a benign hyperpigmented sun induced lesion. Daily sun protection will reduce the number of new lesions. Treatment of these benign lesions are considered cosmetic.    Milia, EICs, multiple    - stable and chronic  Uncertain etiology, testosterone supplementation likely contributes    Patient instructed in importance in daily sun protection of at least spf 30. Sun avoidance and topical protection discussed.   Recommend Elta MD (physician office) COTZ sensitive (available online) for daily use on face and neck.  Patient encouraged to wear hat for all outdoor exposure.   Also discussed sun protective clothing.       Follow-up in about 6 months (around 3/17/2019).   ADDENDUM:  FINAL PATHOLOGIC DIAGNOSIS  1. Skin, left upper arm, shave biopsy:  -LICHENOID KERATOSIS, see comment  COMMENT (part 1): The finding of a lichenoid dermatitis pattern in a solitary lesion is characteristic of a lichenoid  keratosis. Some of these lesions are secondary changes in seborrheic keratoses, solar lentigos, verrucae, and  other conditions. A similar histology with multiple lesions leads to a broader differential diagnosis , including lichen  planus and other lichenoid conditions. Correlation is suggested.  2. Skin, right upper back, shave biopsy:  -ATYPICAL LYMPHOHISTIOCYTIC INFILTRATE, see comment  COMMENT (part 2): Although the infiltrate is predominantly composed of T-cells (with an increased CD4 to CD8  ratio) and shows some loss of CD7, no clonal T-cell receptor gene rearrangement was detected (see [facility]  report). 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  These proliferations may be caused by, but  not limited to, drug reactions (usually anticonvulsant drugs or angiotensin-converting enzyme inhibitor), allergic  contact dermatitis, insect bites, actinic reticuloid, and idiopathic. Clinical correlation and follow up for recurrent or  other similar lesions is recommended.

## 2018-12-20 PROBLEM — Z86.718 HISTORY OF DVT (DEEP VEIN THROMBOSIS): Status: ACTIVE | Noted: 2018-12-20

## 2018-12-20 PROBLEM — Z79.01 LONG TERM (CURRENT) USE OF ANTICOAGULANTS: Status: ACTIVE | Noted: 2018-12-20

## 2018-12-20 PROBLEM — Z86.711 HISTORY OF PULMONARY EMBOLUS (PE): Status: ACTIVE | Noted: 2018-12-20

## 2019-02-11 NOTE — PROGRESS NOTES
[facility] Hematolgy/Oncology  History & Physical    Subjective:      Patient ID:   NAME: Rafa Tompkins : 1935     83 y.o. male    Referring Doc: Noam  Other Physicians: Corwin Olsen Shafor; Tveit        Chief Complaint: hx/of PE and DVT      HPI:  83 y.o. male with diagnosis of pulmonary emboli and DVT who has been referred by Dr Santacruz for evaluation by medical hematology/oncology. He is here with his wife. He was found to have a LLE DVT in the distal superficial femoral and popliteal veins back in 2017. He laos had a JONI and RLL pulmonary emboli at the same time. He has been on coumadin since 2017. No swelling of th leg. He is breathing ok. He uses cane to ambulate. He has some chronic arthritis issues. He denies any CP, SOB, HA's or N/V.               ROS:   GEN: normal without any fever, night sweats or weight loss  HEENT: normal with no HA's, sore throat, stiff neck, changes in vision  CV: normal with no CP, SOB, PND, JEFF or orthopnea  PULM: normal with no SOB, cough, hemoptysis, sputum or pleuritic pain  GI: normal with no abdominal pain, nausea, vomiting, constipation, diarrhea, melanotic stools, BRBPR, or hematemesis  : normal with no hematuria, dysuria  BREAST: normal with no mass, discharge, pain  SKIN: normal with no rash, erythema, bruising, or swelling       Past Medical/Surgical History:  Past Medical History:   Diagnosis Date    Glaucoma     Hypertension     Melanoma     right side of eye    Melanoma in situ     SCC (squamous cell carcinoma)     left temple    Squamous cell carcinoma 2013    right lower leg     Past Surgical History:   Procedure Laterality Date    HIP SURGERY           Allergies:  Review of patient's allergies indicates:   Allergen Reactions    Penicillins        Social/Family History:  Social History     Socioeconomic History    Marital status:      Spouse name: Not on file    Number of children: Not on file    Years of education:  "Not on file    Highest education level: Not on file   Social Needs    Financial resource strain: Not on file    Food insecurity - worry: Not on file    Food insecurity - inability: Not on file    Transportation needs - medical: Not on file    Transportation needs - non-medical: Not on file   Occupational History    Not on file   Tobacco Use    Smoking status: Former Smoker     Types: Pipe     Last attempt to quit: 2014     Years since quittin.0    Smokeless tobacco: Never Used   Substance and Sexual Activity    Alcohol use: Yes     Alcohol/week: 0.6 oz     Types: 1 Glasses of wine per week     Comment: 1 drink a day    Drug use: No    Sexual activity: Yes     Partners: Female   Other Topics Concern    Not on file   Social History Narrative    Not on file     Family History   Problem Relation Age of Onset    Melanoma Neg Hx     Psoriasis Neg Hx     Lupus Neg Hx     Eczema Neg Hx          Medications:    Current Outpatient Medications:     BD LUER-GEOFFREY SYRINGE 3 mL 20 x 1" Syrg, , Disp: , Rfl: 5    calcitRIOL (ROCALTROL) 0.25 MCG Cap, TK ONE C PO  Q WEEK, Disp: , Rfl: 4    finasteride (PROSCAR) 5 mg tablet, , Disp: , Rfl: 2    ketoconazole (NIZORAL) 2 % cream, Apply to itchy red scaly areas of the face twice daily, Disp: 60 g, Rfl: 3    ketoconazole (NIZORAL) 2 % shampoo, Wash hair with medicated shampoo at least 2x/week - let sit on scalp at least 5 minutes prior to rinsing, Disp: 120 mL, Rfl: 5    lisinopril (PRINIVIL,ZESTRIL) 2.5 MG tablet, , Disp: , Rfl: 3    simvastatin (ZOCOR) 40 MG tablet, Take 40 mg by mouth every evening.  , Disp: , Rfl:     testosterone cypionate (DEPOTESTOTERONE CYPIONATE) 200 mg/mL injection, every 28 days. , Disp: , Rfl: 2    timolol maleate 0.5% (TIMOPTIC) 0.5 % Drop, , Disp: , Rfl: 1    TRETINOIN (RETIN-A TOP), Apply topically., Disp: , Rfl:     warfarin (COUMADIN) 2 MG tablet, 1 TABLET BY MOUTH EVERY , TUESDAY, THURSDAY, AND SATURDAY, Disp: , " "Rfl: 3    warfarin (COUMADIN) 4 MG tablet, Take 4 mg by mouth., Disp: , Rfl: 0      Pathology:  Cancer Staging  No matching staging information was found for the patient.      Objective:   Vitals:  Blood pressure 135/78, pulse 84, temperature 97.9 °F (36.6 °C), resp. rate 20, height 5' 6.5" (1.689 m), weight 102.1 kg (225 lb 1.6 oz).    Physical Examination:   GEN: no apparent distress, comfortable; AAOx3  HEAD: atraumatic and normocephalic  EYES: no pallor, no icterus, PERRLA  ENT: OMM, no pharyngeal erythema, external ears WNL; no nasal discharge; no thrush  NECK: no masses, thyroid normal, trachea midline, no LAD/LN's, supple  CV: RRR with no murmur; normal pulse; normal S1 and S2; no pedal edema  CHEST: Normal respiratory effort; CTAB; normal breath sounds; no wheeze or crackles  ABDOM: nontender and nondistended; soft; normal bowel sounds; no rebound/guarding  MUSC/Skeletal: ROM normal; no crepitus; joints normal; no deformities; arthropathy and uses cane to ambulate  EXTREM: no clubbing, cyanosis, inflammation or swelling  SKIN: no rashes, lesions, ulcers, petechiae or subcutaneous nodules; chronic skin changes  : no kowalski  NEURO: grossly intact; motor/sensory WNL; AAOx3; no tremors  PSYCH: normal mood, affect and behavior  LYMPH: normal cervical, supraclavicular, axillary and groin LN's      Labs:     pending      Radiology/Diagnostic Studies:          All lab results and imaging results have been reviewed and discussed with the patient    Assessment:   (1) 83 y.o. male with diagnosis of pulmonary emboli and DVT who has been referred by Dr Santacruz for evaluation by medical hematology/oncology.   -  He was found to have a LLE DVT in the distal superficial femoral and popliteal veins back in Nov 2017.  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 Check to see what labs had been done by Dr Garcia in past  2. Complete the clot workup once the labs are reviewed  3. Continue coumadin for now  4. Check up to date labs  RTC in 3-4 weeks   Fax note to Randy Santacruz MD, Corwin Ramírez        I have explained and the patient understands all of  the current recommendation(s). I have answered all of their questions to the best of my ability and to their complete satisfaction.             Thank you for allowing me to participate in this patient's care. Please call with any questions or concerns.    Electronically signed Doe Hernández MD

## 2019-02-12 ENCOUNTER — OFFICE VISIT (OUTPATIENT)
Dept: HEMATOLOGY/ONCOLOGY | Facility: CLINIC | Age: 84
End: 2019-02-12
Payer: MEDICARE

## 2019-02-12 VITALS
HEIGHT: 67 IN | HEART RATE: 84 BPM | BODY MASS INDEX: 35.34 KG/M2 | DIASTOLIC BLOOD PRESSURE: 78 MMHG | RESPIRATION RATE: 20 BRPM | SYSTOLIC BLOOD PRESSURE: 135 MMHG | WEIGHT: 225.13 LBS | TEMPERATURE: 98 F

## 2019-02-12 DIAGNOSIS — I26.99 OTHER ACUTE PULMONARY EMBOLISM WITHOUT ACUTE COR PULMONALE: ICD-10-CM

## 2019-02-12 DIAGNOSIS — Z86.711 HISTORY OF PULMONARY EMBOLUS (PE): ICD-10-CM

## 2019-02-12 DIAGNOSIS — Z79.01 LONG TERM (CURRENT) USE OF ANTICOAGULANTS: ICD-10-CM

## 2019-02-12 DIAGNOSIS — Z86.718 HISTORY OF DVT (DEEP VEIN THROMBOSIS): Primary | ICD-10-CM

## 2019-02-12 DIAGNOSIS — I82.402 ACUTE DEEP VEIN THROMBOSIS (DVT) OF LEFT LOWER EXTREMITY, UNSPECIFIED VEIN: ICD-10-CM

## 2019-02-12 PROCEDURE — 1100F PR PT FALLS ASSESS DOC 2+ FALLS/FALL W/INJURY/YR: ICD-10-PCS | Mod: ,,, | Performed by: INTERNAL MEDICINE

## 2019-02-12 PROCEDURE — 3288F PR FALLS RISK ASSESSMENT DOCUMENTED: ICD-10-PCS | Mod: ,,, | Performed by: INTERNAL MEDICINE

## 2019-02-12 PROCEDURE — 99203 PR OFFICE/OUTPT VISIT, NEW, LEVL III, 30-44 MIN: ICD-10-PCS | Mod: ,,, | Performed by: INTERNAL MEDICINE

## 2019-02-12 PROCEDURE — 1100F PTFALLS ASSESS-DOCD GE2>/YR: CPT | Mod: ,,, | Performed by: INTERNAL MEDICINE

## 2019-02-12 PROCEDURE — 3288F FALL RISK ASSESSMENT DOCD: CPT | Mod: ,,, | Performed by: INTERNAL MEDICINE

## 2019-02-12 PROCEDURE — 99203 OFFICE O/P NEW LOW 30 MIN: CPT | Mod: ,,, | Performed by: INTERNAL MEDICINE

## 2019-02-12 RX ORDER — WARFARIN 2 MG/1
TABLET ORAL
Refills: 3 | COMMUNITY
Start: 2019-01-02 | End: 2020-03-05

## 2019-02-12 NOTE — LETTER
February 12, 2019      Randy Santacruz MD  [address] [place] [address] [address]  [place] [address]           [facility] - Hematology Oncology  [address]  [address]  [place] [address]  Phone: [phone]  Fax: [phone]          Patient: Rafa Tompkins   MR Number: [id number]   YOB: 1935   Date of Visit: 2/12/2019       Dear Dr. Randy Santacruz:    Thank you for referring Rafa Tompkins to me for evaluation. Attached you will find relevant portions of my assessment and plan of care.    If you have questions, please do not hesitate to call me. I look forward to following Rafa Tompkins along with you.    Sincerely,    Doe Hernández MD    Enclosure  CC:  No Recipients    If you would like to receive this communication electronically, please contact [email or web address] or [phone] to request more information on Image Socket Link access.    For providers and/or their staff who would like to refer a patient to [facility], please contact us through our one-stop-shop provider referral line, [facility], at [phone].    If you feel you have received this communication in error or would no longer like to receive these types of communications, please e-mail [email or web address]

## 2019-02-19 LAB
ALBUMIN SERPL-MCNC: 3.7 G/DL (ref 3.6–5.1)
ALBUMIN/GLOB SERPL: 1.2 (CALC) (ref 1–2.5)
ALP SERPL-CCNC: 56 U/L (ref 40–115)
ALT SERPL-CCNC: 14 U/L (ref 9–46)
AST SERPL-CCNC: 18 U/L (ref 10–35)
BASOPHILS # BLD AUTO: 56 CELLS/UL (ref 0–200)
BASOPHILS NFR BLD AUTO: 0.7 %
BILIRUB SERPL-MCNC: 0.4 MG/DL (ref 0.2–1.2)
BUN SERPL-MCNC: 23 MG/DL (ref 7–25)
BUN/CREAT SERPL: 17 (CALC) (ref 6–22)
CALCIUM SERPL-MCNC: 8.7 MG/DL (ref 8.6–10.3)
CHLORIDE SERPL-SCNC: 103 MMOL/L (ref 98–110)
CO2 SERPL-SCNC: 29 MMOL/L (ref 20–32)
CREAT SERPL-MCNC: 1.38 MG/DL (ref 0.7–1.11)
EOSINOPHIL # BLD AUTO: 432 CELLS/UL (ref 15–500)
EOSINOPHIL NFR BLD AUTO: 5.4 %
ERYTHROCYTE [DISTWIDTH] IN BLOOD BY AUTOMATED COUNT: 12.1 % (ref 11–15)
GFRSERPLBLD MDRD-ARVRAT: 47 ML/MIN/1.73M2
GLOBULIN SER CALC-MCNC: 3.1 G/DL (CALC) (ref 1.9–3.7)
GLUCOSE SERPL-MCNC: 91 MG/DL (ref 65–99)
HCT VFR BLD AUTO: 41.6 % (ref 38.5–50)
HGB BLD-MCNC: 13.9 G/DL (ref 13.2–17.1)
LYMPHOCYTES # BLD AUTO: 1800 CELLS/UL (ref 850–3900)
LYMPHOCYTES NFR BLD AUTO: 22.5 %
MCH RBC QN AUTO: 31.7 PG (ref 27–33)
MCHC RBC AUTO-ENTMCNC: 33.4 G/DL (ref 32–36)
MCV RBC AUTO: 95 FL (ref 80–100)
MONOCYTES # BLD AUTO: 824 CELLS/UL (ref 200–950)
MONOCYTES NFR BLD AUTO: 10.3 %
NEUTROPHILS # BLD AUTO: 4888 CELLS/UL (ref 1500–7800)
NEUTROPHILS NFR BLD AUTO: 61.1 %
PLATELET # BLD AUTO: 189 THOUSAND/UL (ref 140–400)
PMV BLD REES-ECKER: 10.9 FL (ref 7.5–12.5)
POTASSIUM SERPL-SCNC: 4.6 MMOL/L (ref 3.5–5.3)
PROT SERPL-MCNC: 6.8 G/DL (ref 6.1–8.1)
RBC # BLD AUTO: 4.38 MILLION/UL (ref 4.2–5.8)
SODIUM SERPL-SCNC: 137 MMOL/L (ref 135–146)
WBC # BLD AUTO: 8 THOUSAND/UL (ref 3.8–10.8)

## 2019-02-22 LAB
CARDIOLIPIN IGM SER IA-ACNC: <12 MPL
CONFIRM APTT STACLOT: NEGATIVE
CONFIRM DRVVT: NEGATIVE S
F2 C.20210G>A GENO BLD/T: NORMAL
FACT IX ACT/NOR PPP: 40 % NORMAL (ref 60–160)
FACT VII ACT/NOR PPP: 7 % NORMAL (ref 60–150)
FACT VIII ACT/NOR PPP: 82 % NORMAL (ref 50–180)
MTHFR GENE MUT ANL BLD/T: NORMAL
PS IGA SER-ACNC: <20 U/ML
PS IGG SER-ACNC: <10 U/ML
PS IGM SER-ACNC: <25 U/ML
SCREEN DRVVT: 47 SECONDS

## 2019-03-12 NOTE — PROGRESS NOTES
"[facility] Hematology/Oncology  PROGRESS NOTE - 2nd Follow-up Visit      Subjective:       Patient ID:   NAME: Rafa Tompkins : 1935     84 y.o. male    Referring Doc: Noam  Other Physicians: Pretty/tres, Corwin, Julian; Dori        Chief Complaint:   PE/DVT f/u    History of Present Illness:     Patient returns today for a 2nd regularly scheduled follow-up visit.  The patient is here today to go over the results of the recently ordered labs, tests and studies. He is here with his wife. He is doing ok with no new issues. We went over the MTHFR results and discussed the genetic implications. He denies any CP, SOB, HA's or N/V. He had some left leg pain last week but it resolved.             ROS:   GEN: normal without any fever, night sweats or weight loss  HEENT: normal with no HA's, sore throat, stiff neck, changes in vision  CV: normal with no CP, SOB, PND, JEFF or orthopnea  PULM: normal with no SOB, cough, hemoptysis, sputum or pleuritic pain  GI: normal with no abdominal pain, nausea, vomiting, constipation, diarrhea, melanotic stools, BRBPR, or hematemesis  : normal with no hematuria, dysuria  BREAST: normal with no mass, discharge, pain  SKIN: normal with no rash, erythema, bruising, or swelling    Allergies:  Review of patient's allergies indicates:   Allergen Reactions    Penicillins        Medications:    Current Outpatient Medications:     BD LUER-GEOFFREY SYRINGE 3 mL 20 x 1" Syrg, , Disp: , Rfl: 5    calcitRIOL (ROCALTROL) 0.25 MCG Cap, TK ONE C PO  Q WEEK, Disp: , Rfl: 4    doxazosin (CARDURA) 4 MG tablet, Take 4 mg by mouth once daily., Disp: , Rfl: 1    finasteride (PROSCAR) 5 mg tablet, , Disp: , Rfl: 2    ketoconazole (NIZORAL) 2 % cream, Apply to itchy red scaly areas of the face twice daily, Disp: 60 g, Rfl: 3    ketoconazole (NIZORAL) 2 % shampoo, Wash hair with medicated shampoo at least 2x/week - let sit on scalp at least 5 minutes prior to rinsing, Disp: 120 mL, Rfl: 5    " lisinopril (PRINIVIL,ZESTRIL) 2.5 MG tablet, , Disp: , Rfl: 3    simvastatin (ZOCOR) 40 MG tablet, Take 40 mg by mouth every evening.  , Disp: , Rfl:     testosterone cypionate (DEPOTESTOTERONE CYPIONATE) 200 mg/mL injection, every 28 days. , Disp: , Rfl: 2    timolol maleate 0.5% (TIMOPTIC) 0.5 % Drop, , Disp: , Rfl: 1    TRETINOIN (RETIN-A TOP), Apply topically., Disp: , Rfl:     warfarin (COUMADIN) 2 MG tablet, 1 TABLET BY MOUTH EVERY SUNDAY, TUESDAY, THURSDAY, AND SATURDAY, Disp: , Rfl: 3    warfarin (COUMADIN) 4 MG tablet, Take 4 mg by mouth., Disp: , Rfl: 0    PMHx/PSHx Updates:  See patient's last visit with me on 2/12/2019.  See H&P on 2/12/2019        Pathology:  Cancer Staging  No matching staging information was found for the patient.          Objective:     Vitals:  Blood pressure 113/69, pulse 78, temperature 98.3 °F (36.8 °C), resp. rate 20, weight 103.3 kg (227 lb 12.8 oz).    Physical Examination:   GEN: no apparent distress, comfortable; AAOx3  HEAD: atraumatic and normocephalic  EYES: no pallor, no icterus, PERRLA  ENT: OMM, no pharyngeal erythema, external ears WNL; no nasal discharge; no thrush  NECK: no masses, thyroid normal, trachea midline, no LAD/LN's, supple  CV: RRR with + murmur; normal pulse; normal S1 and S2; no pedal edema  CHEST: Normal respiratory effort; CTAB; normal breath sounds; no wheeze or crackles  ABDOM: nontender and nondistended; soft; normal bowel sounds; no rebound/guarding  MUSC/Skeletal: ROM normal; no crepitus; joints normal; no deformities; arthropathy and uses cane to ambulate  EXTREM: no clubbing, cyanosis, inflammation; chronic swelling in LLE  SKIN: no rashes, lesions, ulcers, petechiae or subcutaneous nodules; chronic skin changes on feet  : no kowalski  NEURO: grossly intact; motor/sensory WNL; AAOx3; no tremors  PSYCH: normal mood, affect and behavior  LYMPH: normal cervical, supraclavicular, axillary and groin LN's             Labs:   dRVVT Confirm  NEGATIVE     Cardiolipin Ab IgM MPL <12      Phosphatidylserine Ab IgA U/mL <20    Comment:        <20   Negative     20-30  Equivocal- Found in small percentage of                       the healthy population; may                       be reactive      >30   Positive - Risk factor for thrombosis    Phosphatidylserine Ab IgG U/mL <10    Comment:        <10   Negative     10-20  Equivocal- Found in small percentage of                       the healthy population; may                       be reactive      >20   Positive - Risk factor for thrombosis                       and pregnancy loss    Phosphatidylserine Ab IgM U/mL <25      MTHFR SEE NOTE    Comment: RESULT:   POSITIVE FOR TWO COPIES OF THE C677T VARIANT      Prothrombin Gene Mutation Interp SEE NOTE    Comment: RESULT:   [id number] MUTATION NOT DETECTED     Lab Results   Component Value Date    WBC 8.0 02/18/2019    HGB 13.9 02/18/2019    HCT 41.6 02/18/2019    MCV 95.0 02/18/2019     02/18/2019     CMP  Sodium   Date Value Ref Range Status   02/18/2019 137 135 - 146 mmol/L Final     Potassium   Date Value Ref Range Status   02/18/2019 4.6 3.5 - 5.3 mmol/L Final     Chloride   Date Value Ref Range Status   02/18/2019 103 98 - 110 mmol/L Final     CO2   Date Value Ref Range Status   02/18/2019 29 20 - 32 mmol/L Final     Glucose   Date Value Ref Range Status   02/18/2019 91 65 - 99 mg/dL Final     Comment:                   Fasting reference interval          BUN, Bld   Date Value Ref Range Status   02/18/2019 23 7 - 25 mg/dL Final     Creatinine   Date Value Ref Range Status   02/18/2019 1.38 (H) 0.70 - 1.11 mg/dL Final     Comment:     For patients >49 years of age, the reference limit  for Creatinine is approximately 13% higher for people  identified as -American.        11/19/2012 1.6 (H) 0.5 - 1.4 mg/dL Final     Calcium   Date Value Ref Range Status   02/18/2019 8.7 8.6 - 10.3 mg/dL Final   11/19/2012 8.5 (L) 8.7 - 10.5 mg/dL Final     Total  Protein   Date Value Ref Range Status   02/18/2019 6.8 6.1 - 8.1 g/dL Final     Albumin   Date Value Ref Range Status   02/18/2019 3.7 3.6 - 5.1 g/dL Final     Total Bilirubin   Date Value Ref Range Status   02/18/2019 0.4 0.2 - 1.2 mg/dL Final     Alkaline Phosphatase   Date Value Ref Range Status   02/18/2019 56 40 - 115 U/L Final   11/19/2012 59 55 - 135 U/L Final     AST   Date Value Ref Range Status   02/18/2019 18 10 - 35 U/L Final   11/19/2012 27 10 - 40 U/L Final     ALT   Date Value Ref Range Status   02/18/2019 14 9 - 46 U/L Final     Anion Gap   Date Value Ref Range Status   10/12/2014 12 8 - 16 mmol/L Final   11/19/2012 10 5 - 15 meq/L Final     eGFR if    Date Value Ref Range Status   02/18/2019 55 (L) > OR = 60 mL/min/1.73m2 Final     eGFR if non    Date Value Ref Range Status   02/18/2019 47 (L) > OR = 60 mL/min/1.73m2 Final             Radiology/Diagnostic Studies:    No results found.    I have reviewed all available lab results and radiology reports.    Assessment/Plan:   (1) 84 y.o. male with diagnosis of pulmonary emboli and DVT who has been referred by Dr Santacruz for evaluation by medical hematology/oncology.   -  He was found to have a LLE DVT in the distal superficial femoral and popliteal veins back in Nov 2017.  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 Check homocysteine  2. Re-order factor V leiden  3. I recommend consideration for continual anticoagulation (possibly life-long)  4. recommend referral to cardiology for the pedal swelling etc  RTC in 6-8 weeks  Fax note to  Randy Santacruz MD, Corwin Ramírez Lam      Discussion:       I spent over 25 mins of time with the patient. Reviewed results of the recently ordered labs, tests and studies; made directives with regards to the results. Over half of this time was spent couseling and coordinating care.    I have explained all of the above in detail and the patient understands all of the current recommendation(s). I have answered all of their questions to the best of my ability and to their complete satisfaction.   The patient is to continue with the current management plan.            Electronically signed by Doe Hernández MD

## 2019-03-13 ENCOUNTER — OFFICE VISIT (OUTPATIENT)
Dept: HEMATOLOGY/ONCOLOGY | Facility: CLINIC | Age: 84
End: 2019-03-13
Payer: MEDICARE

## 2019-03-13 VITALS
DIASTOLIC BLOOD PRESSURE: 69 MMHG | BODY MASS INDEX: 36.22 KG/M2 | TEMPERATURE: 98 F | RESPIRATION RATE: 20 BRPM | HEART RATE: 78 BPM | SYSTOLIC BLOOD PRESSURE: 113 MMHG | WEIGHT: 227.81 LBS

## 2019-03-13 DIAGNOSIS — Z15.89 HOMOZYGOUS MTHFR MUTATION C677T: ICD-10-CM

## 2019-03-13 DIAGNOSIS — Z86.718 HISTORY OF DVT (DEEP VEIN THROMBOSIS): ICD-10-CM

## 2019-03-13 DIAGNOSIS — I26.99 OTHER ACUTE PULMONARY EMBOLISM WITHOUT ACUTE COR PULMONALE: ICD-10-CM

## 2019-03-13 DIAGNOSIS — I82.402 ACUTE DEEP VEIN THROMBOSIS (DVT) OF LEFT LOWER EXTREMITY, UNSPECIFIED VEIN: ICD-10-CM

## 2019-03-13 DIAGNOSIS — Z86.711 HISTORY OF PULMONARY EMBOLUS (PE): Primary | ICD-10-CM

## 2019-03-13 DIAGNOSIS — Z79.01 LONG TERM (CURRENT) USE OF ANTICOAGULANTS: ICD-10-CM

## 2019-03-13 PROCEDURE — 1100F PTFALLS ASSESS-DOCD GE2>/YR: CPT | Mod: ,,, | Performed by: INTERNAL MEDICINE

## 2019-03-13 PROCEDURE — 1100F PR PT FALLS ASSESS DOC 2+ FALLS/FALL W/INJURY/YR: ICD-10-PCS | Mod: ,,, | Performed by: INTERNAL MEDICINE

## 2019-03-13 PROCEDURE — 99215 PR OFFICE/OUTPT VISIT, EST, LEVL V, 40-54 MIN: ICD-10-PCS | Mod: ,,, | Performed by: INTERNAL MEDICINE

## 2019-03-13 PROCEDURE — 99215 OFFICE O/P EST HI 40 MIN: CPT | Mod: ,,, | Performed by: INTERNAL MEDICINE

## 2019-03-13 PROCEDURE — 3288F FALL RISK ASSESSMENT DOCD: CPT | Mod: ,,, | Performed by: INTERNAL MEDICINE

## 2019-03-13 PROCEDURE — 3288F PR FALLS RISK ASSESSMENT DOCUMENTED: ICD-10-PCS | Mod: ,,, | Performed by: INTERNAL MEDICINE

## 2019-03-13 RX ORDER — DOXAZOSIN 4 MG/1
4 TABLET ORAL DAILY
Refills: 1 | COMMUNITY
Start: 2019-02-19

## 2019-03-13 NOTE — LETTER
March 13, 2019      Randy Santacruz MD  [address] [place] [address] [address]  [place] [address]           [facility] - Hematology Oncology  [address] [address]  [address]  [place] [address]  Phone: [phone]  Fax: [phone]          Patient: Rafa Tompkins   MR Number: [id number]   YOB: 1935   Date of Visit: 3/13/2019       Dear Dr. Randy Santacruz:    Thank you for referring Rafa Tompkins to me for evaluation. Attached you will find relevant portions of my assessment and plan of care.    If you have questions, please do not hesitate to call me. I look forward to following Rafa Tompkins along with you.    Sincerely,    Doe Hernández MD    Enclosure  CC:  No Recipients    If you would like to receive this communication electronically, please contact [email or web address] or [phone] to request more information on Friendster Link access.    For providers and/or their staff who would like to refer a patient to [facility], please contact us through our one-stop-shop provider referral line, [facility], at [phone].    If you feel you have received this communication in error or would no longer like to receive these types of communications, please e-mail [email or web address]

## 2019-03-14 ENCOUNTER — OFFICE VISIT (OUTPATIENT)
Dept: DERMATOLOGY | Facility: CLINIC | Age: 84
End: 2019-03-14
Payer: MEDICARE

## 2019-03-14 DIAGNOSIS — Z85.828 HISTORY OF NONMELANOMA SKIN CANCER: ICD-10-CM

## 2019-03-14 DIAGNOSIS — L57.0 ACTINIC KERATOSES: Primary | ICD-10-CM

## 2019-03-14 DIAGNOSIS — L21.9 SEBORRHEIC DERMATITIS: ICD-10-CM

## 2019-03-14 DIAGNOSIS — L82.1 SEBORRHEIC KERATOSES: ICD-10-CM

## 2019-03-14 DIAGNOSIS — L72.0 MILIA: ICD-10-CM

## 2019-03-14 DIAGNOSIS — Z85.820 HISTORY OF MELANOMA: ICD-10-CM

## 2019-03-14 PROCEDURE — 1100F PTFALLS ASSESS-DOCD GE2>/YR: CPT | Mod: CPTII,S$GLB,, | Performed by: DERMATOLOGY

## 2019-03-14 PROCEDURE — 1100F PR PT FALLS ASSESS DOC 2+ FALLS/FALL W/INJURY/YR: ICD-10-PCS | Mod: CPTII,S$GLB,, | Performed by: DERMATOLOGY

## 2019-03-14 PROCEDURE — 17000 DESTRUCT PREMALG LESION: CPT | Mod: S$GLB,,, | Performed by: DERMATOLOGY

## 2019-03-14 PROCEDURE — 17003 DESTRUCTION, PREMALIGNANT LESIONS; SECOND THROUGH 14 LESIONS: ICD-10-PCS | Mod: S$GLB,,, | Performed by: DERMATOLOGY

## 2019-03-14 PROCEDURE — 99999 PR PBB SHADOW E&M-EST. PATIENT-LVL II: ICD-10-PCS | Mod: PBBFAC,,, | Performed by: DERMATOLOGY

## 2019-03-14 PROCEDURE — 17000 PR DESTRUCTION(LASER SURGERY,CRYOSURGERY,CHEMOSURGERY),PREMALIGNANT LESIONS,FIRST LESION: ICD-10-PCS | Mod: S$GLB,,, | Performed by: DERMATOLOGY

## 2019-03-14 PROCEDURE — 17003 DESTRUCT PREMALG LES 2-14: CPT | Mod: S$GLB,,, | Performed by: DERMATOLOGY

## 2019-03-14 PROCEDURE — 99213 PR OFFICE/OUTPT VISIT, EST, LEVL III, 20-29 MIN: ICD-10-PCS | Mod: 25,S$GLB,, | Performed by: DERMATOLOGY

## 2019-03-14 PROCEDURE — 99999 PR PBB SHADOW E&M-EST. PATIENT-LVL II: CPT | Mod: PBBFAC,,, | Performed by: DERMATOLOGY

## 2019-03-14 PROCEDURE — 99213 OFFICE O/P EST LOW 20 MIN: CPT | Mod: 25,S$GLB,, | Performed by: DERMATOLOGY

## 2019-03-14 PROCEDURE — 3288F PR FALLS RISK ASSESSMENT DOCUMENTED: ICD-10-PCS | Mod: CPTII,S$GLB,, | Performed by: DERMATOLOGY

## 2019-03-14 PROCEDURE — 3288F FALL RISK ASSESSMENT DOCD: CPT | Mod: CPTII,S$GLB,, | Performed by: DERMATOLOGY

## 2019-03-14 NOTE — PROGRESS NOTES
Subjective:       Patient ID:  Rafa Tompkins is a 84 y.o. male who presents for   Chief Complaint   Patient presents with    Spot     L cheek, R ear-Dry, previous areas of cryo    Lesion     R ankle-Dry and crusty    Skin Check     UBSE     Patient last seen 09/2018  Biopsy of isolated BCC like lesion with unexpected path of atypical lymphohistiocytic infiltrate on R upper back    H/o AKs treated with cryo and bx of small SCCIS left neck, neg biopsy margins, monitoring  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, neg bx margins, monitoring  Melanoma (C43.9) 2013 right side of eye (def treatment [facility] surgery)  Squamous cell carcinoma (C80.1) 2013 right lower leg    SCC (squamous cell carcinoma) (C44.92) 1995 left temple     in youth    This is a high risk patient here to check for the development of new lesions.  New complaints    FINAL PATHOLOGIC DIAGNOSIS  1. Skin, left upper arm, shave biopsy:  -LICHENOID KERATOSIS, see comment  COMMENT (part 1): The finding of a lichenoid dermatitis pattern in a solitary lesion is characteristic of a lichenoid  keratosis. Some of these lesions are secondary changes in seborrheic keratoses, solar lentigos, verrucae, and  other conditions. A similar histology with multiple lesions leads to a broader differential diagnosis , including lichen  planus and other lichenoid conditions. Correlation is suggested.  2. Skin, right upper back, shave biopsy:  -ATYPICAL LYMPHOHISTIOCYTIC INFILTRATE, see comment  COMMENT (part 2): Although the infiltrate is predominantly composed of T-cells (with an increased CD4 to CD8  ratio) and shows some loss of CD7, no clonal T-cell receptor gene rearrangement was detected (see [place] [facility]  report). 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 These proliferations may be caused by, but  not limited to, drug reactions (usually anticonvulsant drugs or angiotensin-converting enzyme inhibitor), allergic  contact dermatitis, insect bites, actinic reticuloid, and idiopathic. Clinical correlation and follow up for recurrent or  other similar lesions is recommended.    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 + hypercoagulable state (Dr Johnson) he has MTHFR-C homozygous positive      Spot  - Initial  Affected locations: L cheek, R ear.  Duration: 7 months  Signs / symptoms: dryness  Severity: mild  Timing: constant  Aggravated by: nothing  Relieving factors/Treatments tried: nothing  Improvement on treatment: no relief    Lesion  - Initial  Affected locations: R ankle.  Signs / symptoms: dryness and crusting  Severity: mild  Timing: constant  Aggravated by: nothing  Relieving factors/Treatments tried: nothing  Improvement on treatment: no relief        Review of Systems   Constitutional: Negative for fever, chills and fatigue.   Skin: Positive for activity-related sunscreen use and wears hat. Negative for daily sunscreen use.   Hematologic/Lymphatic: Bruises/bleeds easily.        Objective:    Physical Exam   Constitutional: He appears well-developed and well-nourished. No distress.   Neurological: He is alert and oriented to person, place, and time. He is not disoriented.   Psychiatric: He has a normal mood and affect.   Skin:   Areas Examined (abnormalities noted in diagram):   Scalp / Hair Palpated and Inspected  Head / Face Inspection Performed  Neck Inspection Performed  Chest / Axilla Inspection Performed  Abdomen Inspection Performed  Back Inspection Performed  RUE Inspected  LUE Inspection Performed  Nails and Digits Inspection Performed                       Diagram Legend     Erythematous scaling macule/papule c/w actinic keratosis       Vascular papule c/w angioma      Pigmented verrucoid papule/plaque c/w seborrheic keratosis      Yellow umbilicated  papule c/w sebaceous hyperplasia      Irregularly shaped tan macule c/w lentigo     1-2 mm smooth white papules consistent with Milia      Movable subcutaneous cyst with punctum c/w epidermal inclusion cyst      Subcutaneous movable cyst c/w pilar cyst      Firm pink to brown papule c/w dermatofibroma      Pedunculated fleshy papule(s) c/w skin tag(s)      Evenly pigmented macule c/w junctional nevus     Mildly variegated pigmented, slightly irregular-bordered macule c/w mildly atypical nevus      Flesh colored to evenly pigmented papule c/w intradermal nevus       Pink pearly papule/plaque c/w basal cell carcinoma      Erythematous hyperkeratotic cursted plaque c/w SCC      Surgical scar with no sign of skin cancer recurrence      Open and closed comedones      Inflammatory papules and pustules      Verrucoid papule consistent consistent with wart     Erythematous eczematous patches and plaques     Dystrophic onycholytic nail with subungual debris c/w onychomycosis     Umbilicated papule    Erythematous-base heme-crusted tan verrucoid plaque consistent with inflamed seborrheic keratosis     Erythematous Silvery Scaling Plaque c/w Psoriasis     See annotation      Assessment / Plan:        Actinic keratoses  Cryosurgery Procedure Note    Verbal consent from the patient is obtained and the patient is aware of the precancerous quality and need for treatment of these lesions. Liquid nitrogen cryosurgery is applied to the 5 actinic keratoses, as detailed in the physical exam, to produce a freeze injury. The patient is aware that blisters may form and is instructed on wound care with gentle cleansing and use of vaseline ointment to keep moist until healed. The patient is supplied a handout on cryosurgery and is instructed to call if lesions do not completely resolve.    Monitor left sideburn area, treated with cryo today, discussed field tx with efudex, patient wishes to let us reexamine in 6-8 weeks before proceeding  with efudex    Seborrheic keratoses  These are benign inherited growths without a malignant potential. Reassurance given to patient. No treatment is necessary.     History of nonmelanoma skin cancer  History of melanoma  Area of previous NMSC and MM examined. Site well healed with no signs of recurrence.  Upper body skin examination performed today including at least 9 points as noted in physical examination. No lesions suspicious for malignancy noted.    Milia, EICs, multiple, ongoing x years   - stable and chronic  Uncertain etiology, testosterone supplementation likely contributes    Seborrheic dermatitis  Continue keto shampoo    Patient instructed in importance in daily sun protection of at least spf 30. Mineral sunscreen ingredients preferred (Zinc +/- Titanium).   Recommend Elta MD for daily use on face and neck.  Patient encouraged to wear hat for all outdoor exposure.   Also discussed sun avoidance and use of protective clothing.               Follow-up in about 2 months (around 5/14/2019).

## 2019-03-30 LAB
F5 GENE MUT ANL BLD/T: NORMAL
HCYS SERPL-SCNC: 13.6 UMOL/L

## 2019-05-07 NOTE — PROGRESS NOTES
"[facility] Hematology/Oncology  PROGRESS NOTE -  Follow-up Visit      Subjective:       Patient ID:   NAME: Rafa Tompkins : 1935     84 y.o. male    Referring Doc: Noam  Other Physicians: Pretty/tres, Corwin, Julian; Dori        Chief Complaint:   PE/DVT f/u    History of Present Illness:     Patient returns today for a regularly scheduled follow-up visit.  The patient is here today to go over the results of the recently ordered labs, tests and studies. He is here with his wife. He is doing ok with no new issues. He denies any CP, SOB, HA's or N/V. He is on coumadin per direction of Dr Santacruz. No excessive bleeding or bruising.              ROS:   GEN: normal without any fever, night sweats or weight loss  HEENT: normal with no HA's, sore throat, stiff neck, changes in vision  CV: normal with no CP, SOB, PND, JEFF or orthopnea  PULM: normal with no SOB, cough, hemoptysis, sputum or pleuritic pain  GI: normal with no abdominal pain, nausea, vomiting, constipation, diarrhea, melanotic stools, BRBPR, or hematemesis  : normal with no hematuria, dysuria  BREAST: normal with no mass, discharge, pain  SKIN: normal with no rash, erythema, bruising, or swelling    Allergies:  Review of patient's allergies indicates:   Allergen Reactions    Penicillins        Medications:    Current Outpatient Medications:     BD LUER-GEOFFREY SYRINGE 3 mL 20 x 1" Syrg, , Disp: , Rfl: 5    calcitRIOL (ROCALTROL) 0.25 MCG Cap, TK ONE C PO  Q WEEK, Disp: , Rfl: 4    doxazosin (CARDURA) 4 MG tablet, Take 4 mg by mouth once daily., Disp: , Rfl: 1    ketoconazole (NIZORAL) 2 % cream, Apply to itchy red scaly areas of the face twice daily, Disp: 60 g, Rfl: 3    ketoconazole (NIZORAL) 2 % shampoo, Wash hair with medicated shampoo at least 2x/week - let sit on scalp at least 5 minutes prior to rinsing, Disp: 120 mL, Rfl: 5    lisinopril (PRINIVIL,ZESTRIL) 2.5 MG tablet, , Disp: , Rfl: 3    simvastatin (ZOCOR) 40 MG tablet, Take 40 mg " by mouth every evening.  , Disp: , Rfl:     testosterone cypionate (DEPOTESTOTERONE CYPIONATE) 200 mg/mL injection, every 28 days. , Disp: , Rfl: 2    timolol maleate 0.5% (TIMOPTIC) 0.5 % Drop, , Disp: , Rfl: 1    TRETINOIN (RETIN-A TOP), Apply topically., Disp: , Rfl:     warfarin (COUMADIN) 2 MG tablet, 1 TABLET BY MOUTH EVERY SUNDAY, TUESDAY, THURSDAY, AND SATURDAY, Disp: , Rfl: 3    warfarin (COUMADIN) 4 MG tablet, Take 4 mg by mouth., Disp: , Rfl: 0    PMHx/PSHx Updates:  See patient's last visit with me on 3/13/2019.  See H&P on 2/12/2019        Pathology:  Cancer Staging  No matching staging information was found for the patient.          Objective:     Vitals:  Blood pressure 105/66, pulse 67, temperature 98.5 °F (36.9 °C), temperature source Oral, resp. rate 20, weight 104.1 kg (229 lb 9.6 oz).    Physical Examination:   GEN: no apparent distress, comfortable; AAOx3  HEAD: atraumatic and normocephalic  EYES: no pallor, no icterus, PERRLA  ENT: OMM, no pharyngeal erythema, external ears WNL; no nasal discharge; no thrush  NECK: no masses, thyroid normal, trachea midline, no LAD/LN's, supple  CV: RRR with + murmur; normal pulse; normal S1 and S2; no pedal edema  CHEST: Normal respiratory effort; CTAB; normal breath sounds; no wheeze or crackles  ABDOM: nontender and nondistended; soft; normal bowel sounds; no rebound/guarding  MUSC/Skeletal: ROM normal; no crepitus; joints normal; no deformities; arthropathy and uses cane to ambulate  EXTREM: no clubbing, cyanosis, inflammation; chronic swelling in LLE  SKIN: no rashes, lesions, ulcers, petechiae or subcutaneous nodules; chronic skin changes on feet  : no kowalski  NEURO: grossly intact; motor/sensory WNL; AAOx3; no tremors  PSYCH: normal mood, affect and behavior  LYMPH: normal cervical, supraclavicular, axillary and groin LN's             Labs:     FACTOR V LEIDEN (R506Q) MUTATION NOT DETECTED\    Homocysteine, Cardiovascular <11.4 umol/L 13.6            2/18/2019    Lab Results   Component Value Date    WBC 8.0 02/18/2019    HGB 13.9 02/18/2019    HCT 41.6 02/18/2019    MCV 95.0 02/18/2019     02/18/2019     CMP  Sodium   Date Value Ref Range Status   02/18/2019 137 135 - 146 mmol/L Final     Potassium   Date Value Ref Range Status   02/18/2019 4.6 3.5 - 5.3 mmol/L Final     Chloride   Date Value Ref Range Status   02/18/2019 103 98 - 110 mmol/L Final     CO2   Date Value Ref Range Status   02/18/2019 29 20 - 32 mmol/L Final     Glucose   Date Value Ref Range Status   02/18/2019 91 65 - 99 mg/dL Final     Comment:                   Fasting reference interval          BUN, Bld   Date Value Ref Range Status   02/18/2019 23 7 - 25 mg/dL Final     Creatinine   Date Value Ref Range Status   02/18/2019 1.38 (H) 0.70 - 1.11 mg/dL Final     Comment:     For patients >49 years of age, the reference limit  for Creatinine is approximately 13% higher for people  identified as -American.        11/19/2012 1.6 (H) 0.5 - 1.4 mg/dL Final     Calcium   Date Value Ref Range Status   02/18/2019 8.7 8.6 - 10.3 mg/dL Final   11/19/2012 8.5 (L) 8.7 - 10.5 mg/dL Final     Total Protein   Date Value Ref Range Status   02/18/2019 6.8 6.1 - 8.1 g/dL Final     Albumin   Date Value Ref Range Status   02/18/2019 3.7 3.6 - 5.1 g/dL Final     Total Bilirubin   Date Value Ref Range Status   02/18/2019 0.4 0.2 - 1.2 mg/dL Final     Alkaline Phosphatase   Date Value Ref Range Status   02/18/2019 56 40 - 115 U/L Final   11/19/2012 59 55 - 135 U/L Final     AST   Date Value Ref Range Status   02/18/2019 18 10 - 35 U/L Final   11/19/2012 27 10 - 40 U/L Final     ALT   Date Value Ref Range Status   02/18/2019 14 9 - 46 U/L Final     Anion Gap   Date Value Ref Range Status   10/12/2014 12 8 - 16 mmol/L Final   11/19/2012 10 5 - 15 meq/L Final     eGFR if    Date Value Ref Range Status   02/18/2019 55 (L) > OR = 60 mL/min/1.73m2 Final     eGFR if non African  American   Date Value Ref Range Status   02/18/2019 47 (L) > OR = 60 mL/min/1.73m2 Final             Radiology/Diagnostic Studies:    No results found.    I have reviewed all available lab results and radiology reports.    Assessment/Plan:   (1) 84 y.o. male with diagnosis of pulmonary emboli and DVT who has been referred by Dr Santacruz for evaluation by medical hematology/oncology.   -  He was found to have a LLE DVT in the distal superficial femoral and popliteal veins back in Nov 2017.  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 Add folbic  2. Take over management of the coumadin per patient's request  3. I recommend consideration for continual anticoagulation (possibly life-long)  4. recommended referral to cardiology for the pedal swelling etc  RTC in 3 months  Fax note to  Randy Santacruz MD, Corwin Ramírez Lam      Discussion:       I spent over 25 mins of time with the patient. Reviewed results of the recently ordered labs,  tests and studies; made directives with regards to the results. Over half of this time was spent couseling and coordinating care.    I have explained all of the above in detail and the patient understands all of the current recommendation(s). I have answered all of their questions to the best of my ability and to their complete satisfaction.   The patient is to continue with the current management plan.            Electronically signed by Doe Hernández MD

## 2019-05-08 ENCOUNTER — OFFICE VISIT (OUTPATIENT)
Dept: HEMATOLOGY/ONCOLOGY | Facility: CLINIC | Age: 84
End: 2019-05-08
Payer: MEDICARE

## 2019-05-08 VITALS
BODY MASS INDEX: 36.5 KG/M2 | TEMPERATURE: 99 F | WEIGHT: 229.63 LBS | DIASTOLIC BLOOD PRESSURE: 66 MMHG | HEART RATE: 67 BPM | RESPIRATION RATE: 20 BRPM | SYSTOLIC BLOOD PRESSURE: 105 MMHG

## 2019-05-08 DIAGNOSIS — I26.99 OTHER ACUTE PULMONARY EMBOLISM WITHOUT ACUTE COR PULMONALE: ICD-10-CM

## 2019-05-08 DIAGNOSIS — Z15.89 HOMOZYGOUS MTHFR MUTATION C677T: ICD-10-CM

## 2019-05-08 DIAGNOSIS — Z86.711 HISTORY OF PULMONARY EMBOLUS (PE): Primary | ICD-10-CM

## 2019-05-08 DIAGNOSIS — Z79.01 LONG TERM (CURRENT) USE OF ANTICOAGULANTS: ICD-10-CM

## 2019-05-08 DIAGNOSIS — I82.402 ACUTE DEEP VEIN THROMBOSIS (DVT) OF LEFT LOWER EXTREMITY, UNSPECIFIED VEIN: ICD-10-CM

## 2019-05-08 DIAGNOSIS — Z86.718 HISTORY OF DVT (DEEP VEIN THROMBOSIS): ICD-10-CM

## 2019-05-08 PROCEDURE — 99213 OFFICE O/P EST LOW 20 MIN: CPT | Mod: ,,, | Performed by: INTERNAL MEDICINE

## 2019-05-08 PROCEDURE — 1101F PR PT FALLS ASSESS DOC 0-1 FALLS W/OUT INJ PAST YR: ICD-10-PCS | Mod: ,,, | Performed by: INTERNAL MEDICINE

## 2019-05-08 PROCEDURE — 1101F PT FALLS ASSESS-DOCD LE1/YR: CPT | Mod: ,,, | Performed by: INTERNAL MEDICINE

## 2019-05-08 PROCEDURE — 99213 PR OFFICE/OUTPT VISIT, EST, LEVL III, 20-29 MIN: ICD-10-PCS | Mod: ,,, | Performed by: INTERNAL MEDICINE

## 2019-05-08 NOTE — PATIENT INSTRUCTIONS
Cyanocobalamin, Pyridoxine, and Folate  What is this medicine?  A multivitamin containing folic acid, vitamin B6, and vitamin B12.  How should I use this medicine?  Take by mouth with a glass of water. May take with food. Follow the directions on the prescription label. It is usually given once a day. Do not take your medicine more often than directed.  Contact your pediatrician regarding the use of this medicine in children. Special care may be needed.  What side effects may I notice from receiving this medicine?  Side effects that you should report to your doctor or health care professional as soon as possible:  · allergic reaction such as skin rash or difficulty breathing  · vomiting  Side effects that usually do not require medical attention (report to your doctor or health care professional if they continue or are bothersome):  · nausea  · stomach upset  What may interact with this medicine?  · levodopa  What if I miss a dose?  If you miss a dose, take it as soon as you can. If it is almost time for your next dose, take only that dose. Do not take double or extra doses.  Where should I keep my medicine?  Keep out of the reach of children.  Most vitamins should be stored at controlled room temperature. Check your specific product directions. Protect from heat and moisture. Throw away any unused medicine after the expiration date.  What should I tell my health care provider before I take this medicine?  They need to know if you have any of these conditions:  · bleeding or clotting disorder  · history of anemia of any type  · other chronic health condition  · an unusual or allergic reaction to vitamins, other medicines, foods, dyes, or preservatives  · pregnant or trying to get pregnant  · breast-feeding  What should I watch for while using this medicine?  See your health care professional for regular checks on your progress. Remember that vitamin supplements do not replace the need for good nutrition from a  balanced diet.  NOTE:This sheet is a summary. It may not cover all possible information. If you have questions about this medicine, talk to your doctor, pharmacist, or health care provider. Copyright© 2017 [employer]

## 2019-05-14 ENCOUNTER — OFFICE VISIT (OUTPATIENT)
Dept: DERMATOLOGY | Facility: CLINIC | Age: 84
End: 2019-05-14
Payer: MEDICARE

## 2019-05-14 VITALS — BODY MASS INDEX: 35.94 KG/M2 | WEIGHT: 229 LBS | HEIGHT: 67 IN

## 2019-05-14 DIAGNOSIS — L57.0 ACTINIC KERATOSES: Primary | ICD-10-CM

## 2019-05-14 DIAGNOSIS — L82.1 SEBORRHEIC KERATOSES: ICD-10-CM

## 2019-05-14 DIAGNOSIS — Z85.828 HISTORY OF NONMELANOMA SKIN CANCER: ICD-10-CM

## 2019-05-14 DIAGNOSIS — Z85.820 HISTORY OF MELANOMA: ICD-10-CM

## 2019-05-14 PROCEDURE — 99999 PR PBB SHADOW E&M-EST. PATIENT-LVL III: ICD-10-PCS | Mod: PBBFAC,,, | Performed by: DERMATOLOGY

## 2019-05-14 PROCEDURE — 99213 OFFICE O/P EST LOW 20 MIN: CPT | Mod: 25,S$GLB,, | Performed by: DERMATOLOGY

## 2019-05-14 PROCEDURE — 99213 PR OFFICE/OUTPT VISIT, EST, LEVL III, 20-29 MIN: ICD-10-PCS | Mod: 25,S$GLB,, | Performed by: DERMATOLOGY

## 2019-05-14 PROCEDURE — 17000 DESTRUCT PREMALG LESION: CPT | Mod: S$GLB,,, | Performed by: DERMATOLOGY

## 2019-05-14 PROCEDURE — 1101F PR PT FALLS ASSESS DOC 0-1 FALLS W/OUT INJ PAST YR: ICD-10-PCS | Mod: CPTII,S$GLB,, | Performed by: DERMATOLOGY

## 2019-05-14 PROCEDURE — 17003 DESTRUCTION, PREMALIGNANT LESIONS; SECOND THROUGH 14 LESIONS: ICD-10-PCS | Mod: S$GLB,,, | Performed by: DERMATOLOGY

## 2019-05-14 PROCEDURE — 17000 PR DESTRUCTION(LASER SURGERY,CRYOSURGERY,CHEMOSURGERY),PREMALIGNANT LESIONS,FIRST LESION: ICD-10-PCS | Mod: S$GLB,,, | Performed by: DERMATOLOGY

## 2019-05-14 PROCEDURE — 17003 DESTRUCT PREMALG LES 2-14: CPT | Mod: S$GLB,,, | Performed by: DERMATOLOGY

## 2019-05-14 PROCEDURE — 99999 PR PBB SHADOW E&M-EST. PATIENT-LVL III: CPT | Mod: PBBFAC,,, | Performed by: DERMATOLOGY

## 2019-05-14 PROCEDURE — 1101F PT FALLS ASSESS-DOCD LE1/YR: CPT | Mod: CPTII,S$GLB,, | Performed by: DERMATOLOGY

## 2019-05-14 NOTE — PATIENT INSTRUCTIONS

## 2019-05-14 NOTE — PROGRESS NOTES
Subjective:       Patient ID:  Rafa Tompkins is a 84 y.o. male who presents for   Chief Complaint   Patient presents with    Spot     left scalp, itchy, tx w/ cryo 3/14/19     Patient last seen 03/2019  Cryo of lesion on left sideburn, discussed field tx with efudex, here for reevaluation    Biopsy of isolated BCC like lesion 09/2018 with unexpected path of atypical lymphohistiocytic infiltrate on R upper back, monitoring site    H/o AKs treated with cryo and bx of small SCCIS left neck, neg biopsy margins, monitoring  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, neg bx margins, monitoring  Melanoma (C43.9) 2013 right side of eye (def treatment [facility])  Squamous cell carcinoma (C80.1) 2013 right lower leg    SCC (squamous cell carcinoma) (C44.92) 1995 left temple     in youth    This is a high risk patient here to check for the development of new lesions.  New complaints    FINAL PATHOLOGIC DIAGNOSIS  1. Skin, left upper arm, shave biopsy:  -LICHENOID KERATOSIS, see comment  COMMENT (part 1): The finding of a lichenoid dermatitis pattern in a solitary lesion is characteristic of a lichenoid  keratosis. Some of these lesions are secondary changes in seborrheic keratoses, solar lentigos, verrucae, and  other conditions. A similar histology with multiple lesions leads to a broader differential diagnosis , including lichen  planus and other lichenoid conditions. Correlation is suggested.  2. Skin, right upper back, shave biopsy:  -ATYPICAL LYMPHOHISTIOCYTIC INFILTRATE, see comment  COMMENT (part 2): Although the infiltrate is predominantly composed of T-cells (with an increased CD4 to CD8  ratio) and shows some loss of CD7, no clonal T-cell receptor gene rearrangement was detected (see [place] [facility]  report). 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 These proliferations may be caused by, but  not limited to, drug reactions (usually anticonvulsant drugs or angiotensin-converting enzyme inhibitor), allergic  contact dermatitis, insect bites, actinic reticuloid, and idiopathic. Clinical correlation and follow up for recurrent or  other similar lesions is recommended.    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 + hypercoagulable state (Dr Johnson) he has MTHFR-C homozygous positive    Spot  - Follow-up  Symptom course: stable  Affected locations: scalp  Signs / symptoms: itching  Severity: mild        Review of Systems   Constitutional: Negative for fever, chills and fatigue.   Skin: Positive for activity-related sunscreen use and wears hat. Negative for daily sunscreen use.   Hematologic/Lymphatic: Bruises/bleeds easily.        Objective:    Physical Exam   Constitutional: He appears well-developed and well-nourished. No distress.   Neurological: He is alert and oriented to person, place, and time. He is not disoriented.   Psychiatric: He has a normal mood and affect.   Skin:   Areas Examined (abnormalities noted in diagram):   Scalp / Hair Palpated and Inspected  Head / Face Inspection Performed  Neck Inspection Performed  Chest / Axilla Inspection Performed  Abdomen Inspection Performed  Back Inspection Performed  RUE Inspected  LUE Inspection Performed  Nails and Digits Inspection Performed                       Diagram Legend     Erythematous scaling macule/papule c/w actinic keratosis       Vascular papule c/w angioma      Pigmented verrucoid papule/plaque c/w seborrheic keratosis      Yellow umbilicated papule c/w sebaceous hyperplasia      Irregularly shaped tan macule c/w lentigo     1-2 mm smooth white papules consistent with Milia      Movable subcutaneous cyst with punctum c/w epidermal inclusion cyst      Subcutaneous movable cyst c/w pilar cyst      Firm pink to brown papule c/w dermatofibroma      Pedunculated fleshy  papule(s) c/w skin tag(s)      Evenly pigmented macule c/w junctional nevus     Mildly variegated pigmented, slightly irregular-bordered macule c/w mildly atypical nevus      Flesh colored to evenly pigmented papule c/w intradermal nevus       Pink pearly papule/plaque c/w basal cell carcinoma      Erythematous hyperkeratotic cursted plaque c/w SCC      Surgical scar with no sign of skin cancer recurrence      Open and closed comedones      Inflammatory papules and pustules      Verrucoid papule consistent consistent with wart     Erythematous eczematous patches and plaques     Dystrophic onycholytic nail with subungual debris c/w onychomycosis     Umbilicated papule    Erythematous-base heme-crusted tan verrucoid plaque consistent with inflamed seborrheic keratosis     Erythematous Silvery Scaling Plaque c/w Psoriasis     See annotation      Assessment / Plan:        Actinic keratoses  Cryosurgery Procedure Note    Verbal consent from the patient is obtained and the patient is aware of the precancerous quality and need for treatment of these lesions. Liquid nitrogen cryosurgery is applied to the 6 actinic keratoses, as detailed in the physical exam, to produce a freeze injury. The patient is aware that blisters may form and is instructed on wound care with gentle cleansing and use of vaseline ointment to keep moist until healed. The patient is supplied a handout on cryosurgery and is instructed to call if lesions do not completely resolve.    Left sideburn, area of concern at last visit, clinically cleared  Will proceed with efudex BID to left temple area    Seborrheic keratoses  These are benign inherited growths without a malignant potential. Reassurance given to patient. No treatment is necessary.     History of nonmelanoma skin cancer, History of melanoma   Area of previous NMSC (multiple) examined. Site well healed with no signs of recurrence.  Upper body skin examination performed today including at least 9  points as noted in physical examination. No lesions suspicious for malignancy noted.    Plan TBSE at next visit (3 months)    Patient instructed in importance in daily sun protection of at least spf 30. Mineral sunscreen ingredients preferred (Zinc +/- Titanium).   Recommend Elta MD for daily use on face and neck.  Patient encouraged to wear hat for all outdoor exposure.   Also discussed sun avoidance and use of protective clothing.             Follow up in about 3 months (around 8/14/2019).

## 2019-06-07 ENCOUNTER — OFFICE VISIT (OUTPATIENT)
Dept: CARDIOLOGY | Facility: CLINIC | Age: 84
End: 2019-06-07
Payer: MEDICARE

## 2019-06-07 VITALS
DIASTOLIC BLOOD PRESSURE: 70 MMHG | SYSTOLIC BLOOD PRESSURE: 121 MMHG | HEIGHT: 68 IN | WEIGHT: 227 LBS | TEMPERATURE: 98 F | RESPIRATION RATE: 18 BRPM | BODY MASS INDEX: 34.4 KG/M2 | OXYGEN SATURATION: 97 % | HEART RATE: 69 BPM
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
## 2019-06-07 DIAGNOSIS — Z15.89 HOMOZYGOUS MTHFR MUTATION C677T: ICD-10-CM

## 2019-06-07 DIAGNOSIS — E65 ABDOMINAL OBESITY: ICD-10-CM

## 2019-06-07 DIAGNOSIS — E66.09 CLASS 1 OBESITY DUE TO EXCESS CALORIES WITHOUT SERIOUS COMORBIDITY WITH BODY MASS INDEX (BMI) OF 34.0 TO 34.9 IN ADULT: ICD-10-CM

## 2019-06-07 DIAGNOSIS — Z86.711 HISTORY OF PULMONARY EMBOLUS (PE): ICD-10-CM

## 2019-06-07 DIAGNOSIS — R06.09 DOE (DYSPNEA ON EXERTION): ICD-10-CM

## 2019-06-07 DIAGNOSIS — Z91.89 AT RISK FOR CORONARY ARTERY DISEASE: ICD-10-CM

## 2019-06-07 DIAGNOSIS — Z79.01 LONG TERM (CURRENT) USE OF ANTICOAGULANTS: ICD-10-CM

## 2019-06-07 DIAGNOSIS — Z86.718 HISTORY OF DVT (DEEP VEIN THROMBOSIS): ICD-10-CM

## 2019-06-07 DIAGNOSIS — R60.0 PERIPHERAL EDEMA: Primary | ICD-10-CM

## 2019-06-07 DIAGNOSIS — I35.0 NONRHEUMATIC AORTIC VALVE STENOSIS: ICD-10-CM

## 2019-06-07 DIAGNOSIS — I10 ESSENTIAL HYPERTENSION: ICD-10-CM
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
## 2019-06-07 PROCEDURE — 1100F PTFALLS ASSESS-DOCD GE2>/YR: CPT | Mod: CPTII,S$GLB,, | Performed by: INTERNAL MEDICINE

## 2019-06-07 PROCEDURE — 3078F DIAST BP <80 MM HG: CPT | Mod: CPTII,S$GLB,, | Performed by: INTERNAL MEDICINE

## 2019-06-07 PROCEDURE — 3074F PR MOST RECENT SYSTOLIC BLOOD PRESSURE < 130 MM HG: ICD-10-PCS | Mod: CPTII,S$GLB,, | Performed by: INTERNAL MEDICINE

## 2019-06-07 PROCEDURE — 99205 PR OFFICE/OUTPT VISIT, NEW, LEVL V, 60-74 MIN: ICD-10-PCS | Mod: S$GLB,,, | Performed by: INTERNAL MEDICINE

## 2019-06-07 PROCEDURE — 99205 OFFICE O/P NEW HI 60 MIN: CPT | Mod: S$GLB,,, | Performed by: INTERNAL MEDICINE

## 2019-06-07 PROCEDURE — 1100F PR PT FALLS ASSESS DOC 2+ FALLS/FALL W/INJURY/YR: ICD-10-PCS | Mod: CPTII,S$GLB,, | Performed by: INTERNAL MEDICINE

## 2019-06-07 PROCEDURE — 3078F PR MOST RECENT DIASTOLIC BLOOD PRESSURE < 80 MM HG: ICD-10-PCS | Mod: CPTII,S$GLB,, | Performed by: INTERNAL MEDICINE

## 2019-06-07 PROCEDURE — 3074F SYST BP LT 130 MM HG: CPT | Mod: CPTII,S$GLB,, | Performed by: INTERNAL MEDICINE

## 2019-06-07 PROCEDURE — 3288F FALL RISK ASSESSMENT DOCD: CPT | Mod: CPTII,S$GLB,, | Performed by: INTERNAL MEDICINE

## 2019-06-07 PROCEDURE — 3288F PR FALLS RISK ASSESSMENT DOCUMENTED: ICD-10-PCS | Mod: CPTII,S$GLB,, | Performed by: INTERNAL MEDICINE

## 2019-06-07 PROCEDURE — 99999 PR PBB SHADOW E&M-EST. PATIENT-LVL IV: CPT | Mod: PBBFAC,,, | Performed by: INTERNAL MEDICINE

## 2019-06-07 PROCEDURE — 99999 PR PBB SHADOW E&M-EST. PATIENT-LVL IV: ICD-10-PCS | Mod: PBBFAC,,, | Performed by: INTERNAL MEDICINE
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
## 2019-06-07 NOTE — PATIENT INSTRUCTIONS
Recommended Mediterranean dietEating Heart-Healthy Food: Using the DASH Plan  Eating for your heart doesnt have to be hard or boring. You just need to know how to make healthier choices. The DASH eating plan has been developed to help you do just that. DASH stands for Dietary Approaches to Stop Hypertension. It is a plan that has been proven to be healthier for your heart and to lower your risk for high blood pressure. It can also help lower your risk for cancer, heart disease, osteoporosis, and diabetes.  Choosing from Each Food Group  Choose foods from each of the food groups below each day. Try to get the recommended number of servings for each food group. The serving numbers are based on a diet of 2,000 calories a day. Talk to your doctor if youre unsure about your calorie needs.  Grains   Servings: 7-8 a day  A serving is:  · 1 slice bread  · 1 ounce dry cereal  · half a cup cooked rice or pasta  Best choices: Whole grains and any grains high in fiber.  Vegetables   Servings: 4-5 a day  A serving is:  · 1 cup raw leafy vegetable  · Half a cup cooked vegetable  · Three-quarter cup vegetable juice  Best choices: Fresh or frozen vegetable prepared without too much added salt or fat.    Fruits   Servings: 4-5 a day  A serving is:  · Three-quarter cup fruit juice  · 1 medium fruit  · One-quarter cup dried fruit  · One-half cup fresh, frozen, or canned fruit  Best choices: A variety of fresh fruits of different colors. Whole fruits are a much better choice than fruit juices.  Low-fat or Fat Free Dairy   Servings: 2-3 a day  A serving is:  · 8 ounces milk  · 1 cup yogurt  · One and a half ounces cheese  Best choices: Skim or 1% milk, low-fat or fat free yogurt or buttermilk, and low-fat cheeses.       Meat, Poultry, Fish   Servings: 2 or fewer a day  A serving is:  · 3 ounces cooked meat, poultry, or fish  Best choices: Lean meats and fish. Trim away visible fat. Broil, roast, or boil instead of frying. Remove skin  from poultry before eating.  Nuts, Seeds, Beans   Servings: 4-5 a week  A serving is:  · One third cup nuts (or one and a half ounces)  · 2 tablespoons sunflower seeds  · Half a cup cooked beans  Best choices: Dry roasted nuts with no salt added, lentils, kidney beans, garbanzo beans, and whole huffman beans.    Fats and Oils   Servings: 2 a day  A serving is:  · 1 teaspoon vegetable oil  · 1 teaspoon soft margarine  · 1 tablespoon low-fat mayonnaise  · 1 teaspoon regular mayonnaise  · 2 tablespoons light salad dressing  · 1 tablespoon regular salad dressing  Best choices: Monounsaturated and polyunsaturated fats such as olive, canola, or safflower oil.  Sweets   Servings: 5 a week or fewer  A serving is:  · 1 tablespoon sugar, maple syrup, or honey  · 1 tablespoon jam or jelly  · 1 half-ounce jelly beans (about 15)  · 8 ounces lemonade  Best choices: Dried fruit can be a satisfying sweet. Choose low-fat sweets when possible. And watch your serving sizes!       Aerobic Exercise for a Healthy Heart  Exercise is a lot more than an energy booster and a stress reliever. It also strengthens your heart muscle, lowers your blood pressure and blood cholesterol, and burns calories.      Remember, some activity is better than none.     Choose an Aerobic Activity  Choose a nonstop activity that makes your heart and lungs work harder than they do when you rest or walk normally. This aerobic exercise can improve the way your heart and other muscles use oxygen. Make it fun by exercising with a friend and choosing an activity you enjoy. Here are some ideas:  · Walking  · Swimming  · Bicycling  · Stair climbing  · Dancing  · Jogging  Exercise Regularly  If you havent been exercising regularly,  get your doctors okay first. Then start slowly.  Here are some tips:  · Begin exercising 3 times a week for 5-10 minutes at a time.  · When you feel comfortable, add a few minutes each week.  · Slowly build up to exercising 3-4 times each  week for 20-40 minutes. Aim for a total of 150 or more minutes a week.  · Be sure to carry your nitroglycerin with you when you exercise.  · If you get angina when youre exercising, stop what youre doing, take your nitroglycerin, and call your doctor.  © 2302-9867 [employer], [address] [place] [address], [place] [address]. All rights reserved. This information is not intended as a substitute for professional medical care. Always follow your healthcare professional's instructions.  Losing Weight (Cardiovascular)  Excess weight is a major risk factor for heart disease. Losing weight may help keep your arteries open so that your heart can get the oxygen-rich blood it needs. Weight loss can also help lower your blood pressure and reduce your risk for diabetes. All in all, losing weight makes you healthier.          Exercise with a friend. When activity is fun, you're more likely to stick with it.        Calories and Weight Loss  Calories are the fuel your body burns for energy. You get the calories you need from the food you eat. For healthy weight loss, women should eat at least 1,200 calories a day, men at least 1,500.    When you eat more calories than you need, your body stores the extra calories as fat. One pound of fat equals 3,500 calories.    To lose weight, try to burn 500 calories a day more than you eat. To do this, eat 250 calories less each day. Add activity to burn the other 250 calories. Walking 21/2 miles burns about 250 calories.    Eat a variety of healthy foods. Its the best way to make calories count.     Tips for losing weight:  Drink 8 to 10 glasses of water a day.    Dont skip meals. Instead, eat smaller portions.       Brisk Activity Is Best  Brisk activity gets your heart pumping faster. It makes your heart healthier. Its also the best way to burn calories. In fact, your body may keep burning calories for hours after you stop a brisk activity.    Begin by walking 10 minutes most  days.    Add more time and speed to your walk. Build up as you feel able.    Try to walk briskly at least 30 minutes most days. If needed, you can break this into 2 shorter sessions.     Check off the ideas below that you could try to make your day more active:    Take the stairs instead of the elevator.    Park your car farther away and walk.    Ride a bike to work or to the store.    Walk laps around the mall.  Heart Valve Problems: Aortic Stenosis  Aortic stenosis means your aortic valve has a problem opening. The left ventricle has to work harder to push the blood through the valve. In some cases, this extra work will make the muscle of the ventricle thicken. In time, the extra work can tire the heart and cause the heart muscle to weaken. Stenosis usually get worse slowly, over many years. But sometimes it can quickly get worse.  Possible causes  Calcium deposits can form on the aortic valve as you get older. These deposits make the valve stiff and hard to open. In some cases, you may have been born with an abnormal aortic valve. Or your aortic valve may have been damaged by rheumatic fever or a heart infection.        Open aortic valve with stenosis (viewed from above).      Treating aortic stenosis  In many cases, treatment wont be needed unless you have symptoms. If you do have symptoms, medicines may help relieve them. If the stenosis is severe, your doctor may recommend surgery to replace the valve, even if you dont have symptoms.  Date Last Reviewed: 6/1/2016  © 3994-8565 [employer]. [address] [place] [address]. All rights reserved. This information is not intended as a substitute for professional medical care. Always follow your healthcare professional's instructions.        Understanding Heart Valves  The heart contains 4 valves. They are the aortic, pulmonary, tricuspid, and mitral valves. The valves open and close to keep blood moving in the right direction through  the heart. As the heart beats, blood moves through it. With each squeeze, the valves open and close to keep blood moving forward. In this way, valves keep blood moving as efficiently as possible through the heart.     The valve opens to let blood move forward.        The valve closes to stop blood from leaking back.       When the heart relaxes between beats:  · Oxygen-rich blood from the lungs fills the left atrium.  · Oxygen-poor blood from the body fills the right atrium.  When the atria beat:  · The left atrium squeezes, pushing blood through the mitral valve into the left ventricle.  · The right atrium squeezes, pushing blood through the tricuspid valve into the right ventricle.  When the ventricles beat:  · The left ventricle squeezes, pushing blood through the aortic valve out to the coronary arteries, the brain, and body.  · The right ventricle squeezes, pushing blood through the pulmonary valve to the lungs.    Date Last Reviewed: 4/27/2016  © 3352-8693 [employer] [address] [place] [address], [place] [address]. All rights reserved. This information is not intended as a substitute for professional medical care. Always follow your healthcare professional's instructions.

## 2019-06-07 NOTE — LETTER
June 7, 2019      Doe Hernández MD  [address]  [address]  [place] [address]           [facility]  [address] [place] [place] [address]  Phone: [phone]  Fax: [phone]          Patient: Rafa Tompkins   MR Number: [id number]   YOB: 1935   Date of Visit: 6/7/2019       Dear Dr. Doe Hernández:    Thank you for referring Rafa Tompkins to me for evaluation. Attached you will find relevant portions of my assessment and plan of care.    If you have questions, please do not hesitate to call me. I look forward to following Rafa Tompkins along with you.    Sincerely,    Rhett Chahal MD    Enclosure  CC:  No Recipients    If you would like to receive this communication electronically, please contact [email or web address] or [phone] to request more information on [employer] Link access.    For providers and/or their staff who would like to refer a patient to [facility], please contact us through our one-stop-shop provider referral line, [facility], at [phone].    If you feel you have received this communication in error or would no longer like to receive these types of communications, please e-mail [email or web address]

## 2019-07-01 ENCOUNTER — HOSPITAL ENCOUNTER (OUTPATIENT)
Dept: CARDIOLOGY | Facility: HOSPITAL | Age: 84
Discharge: HOME | End: 2019-07-01
Attending: INTERNAL MEDICINE
Payer: MEDICARE

## 2019-07-01 ENCOUNTER — HOSPITAL ENCOUNTER (OUTPATIENT)
Dept: RADIOLOGY | Facility: HOSPITAL | Age: 84
Discharge: HOME | End: 2019-07-01
Attending: INTERNAL MEDICINE
Payer: MEDICARE

## 2019-07-01 VITALS
SYSTOLIC BLOOD PRESSURE: 139 MMHG | DIASTOLIC BLOOD PRESSURE: 73 MMHG | HEIGHT: 68 IN | BODY MASS INDEX: 34.4 KG/M2 | WEIGHT: 227 LBS

## 2019-07-01 VITALS — SYSTOLIC BLOOD PRESSURE: 124 MMHG | DIASTOLIC BLOOD PRESSURE: 86 MMHG | HEART RATE: 65 BPM

## 2019-07-01 DIAGNOSIS — Z91.89 AT RISK FOR CORONARY ARTERY DISEASE: ICD-10-CM

## 2019-07-01 DIAGNOSIS — I10 ESSENTIAL HYPERTENSION: ICD-10-CM

## 2019-07-01 DIAGNOSIS — Z86.711 HISTORY OF PULMONARY EMBOLUS (PE): ICD-10-CM

## 2019-07-01 DIAGNOSIS — I35.0 NONRHEUMATIC AORTIC VALVE STENOSIS: ICD-10-CM

## 2019-07-01 DIAGNOSIS — R60.0 PERIPHERAL EDEMA: ICD-10-CM

## 2019-07-01 DIAGNOSIS — R06.09 DOE (DYSPNEA ON EXERTION): ICD-10-CM

## 2019-07-01 LAB
AORTIC ROOT ANNULUS: 3.38 CM
AORTIC VALVE CUSP SEPERATION: 0.95 CM
AV INDEX (PROSTH): 0.28
AV MEAN GRADIENT: 49 MMHG
AV PEAK GRADIENT: 80 MMHG
AV VALVE AREA: 0.84 CM2
AV VELOCITY RATIO: 0.25
BSA FOR ECHO PROCEDURE: 2.22 M2
CV ECHO LV RWT: 0.68 CM
CV STRESS BASE HR: 68 BPM
DIASTOLIC BLOOD PRESSURE: 86 MMHG
DOP CALC AO PEAK VEL: 4.46 M/S
DOP CALC AO VTI: 109.17 CM
DOP CALC LVOT AREA: 3 CM2
DOP CALC LVOT DIAMETER: 1.97 CM
DOP CALC LVOT PEAK VEL: 1.13 M/S
DOP CALC LVOT STROKE VOLUME: 92 CM3
DOP CALCLVOT PEAK VEL VTI: 30.2 CM
E WAVE DECELERATION TIME: 242.13 MSEC
E/A RATIO: 0.65
E/E' RATIO: 12 M/S
ECHO LV POSTERIOR WALL: 1.24 CM (ref 0.6–1.1)
FRACTIONAL SHORTENING: 32 % (ref 28–44)
INTERVENTRICULAR SEPTUM: 1.18 CM (ref 0.6–1.1)
IVRT: 0.13 MSEC
LEFT ATRIUM SIZE: 4.45 CM
LEFT INTERNAL DIMENSION IN SYSTOLE: 2.48 CM (ref 2.1–4)
LEFT VENTRICLE DIASTOLIC VOLUME INDEX: 26.47 ML/M2
LEFT VENTRICLE DIASTOLIC VOLUME: 57.09 ML
LEFT VENTRICLE MASS INDEX: 68 G/M2
LEFT VENTRICLE SYSTOLIC VOLUME INDEX: 10.2 ML/M2
LEFT VENTRICLE SYSTOLIC VOLUME: 21.99 ML
LEFT VENTRICULAR INTERNAL DIMENSION IN DIASTOLE: 3.67 CM (ref 3.5–6)
LEFT VENTRICULAR MASS: 147.39 G
LV LATERAL E/E' RATIO: 11 M/S
LV SEPTAL E/E' RATIO: 13.2 M/S
MV A" WAVE DURATION": 171 MSEC
MV PEAK A VEL: 1.02 M/S
MV PEAK E VEL: 0.66 M/S
OHS CV CPX 85 PERCENT MAX PREDICTED HEART RATE MALE: 116
OHS CV CPX MAX PREDICTED HEART RATE: 137
OHS CV CPX PATIENT IS FEMALE: 0
OHS CV CPX PATIENT IS MALE: 1
OHS CV CPX PEAK DIASTOLIC BLOOD PRESSURE: 73 MMHG
OHS CV CPX PEAK HEAR RATE: 98 BPM
OHS CV CPX PEAK RATE PRESSURE PRODUCT: NORMAL
OHS CV CPX PEAK SYSTOLIC BLOOD PRESSURE: 159 MMHG
OHS CV CPX PERCENT MAX PREDICTED HEART RATE ACHIEVED: 72
OHS CV CPX RATE PRESSURE PRODUCT PRESENTING: 8432
PISA TR MAX VEL: 2.7 M/S
PULM VEIN A" WAVE DURATION": 148 MSEC
PULM VEIN S/D RATIO: 0.85
PV PEAK D VEL: 0.6 M/S
PV PEAK S VEL: 0.51 M/S
PV PEAK VELOCITY: 0.94 CM/S
RA PRESSURE: 3 MMHG
RIGHT VENTRICULAR END-DIASTOLIC DIMENSION: 3.6 CM
STRESS ECHO POST EXERCISE DUR MIN: 1 MINUTES
STRESS ECHO POST EXERCISE DUR SEC: 3 SECONDS
STRESS ECHO TARGET HR: 116.45 BPM
SYSTOLIC BLOOD PRESSURE: 124 MMHG
TDI LATERAL: 0.06 M/S
TDI SEPTAL: 0.05 M/S
TDI: 0.06 M/S
TR MAX PG: 29 MMHG
TRICUSPID ANNULAR PLANE SYSTOLIC EXCURSION: 2.91 CM
TV REST PULMONARY ARTERY PRESSURE: 32 MMHG

## 2019-07-01 PROCEDURE — 93306 TTE W/DOPPLER COMPLETE: CPT | Mod: 26,,, | Performed by: INTERNAL MEDICINE

## 2019-07-01 PROCEDURE — 78452 NM MYOCARDIAL PERFUSION SPECT MULTI PHARM: ICD-10-PCS | Mod: 26,,, | Performed by: RADIOLOGY

## 2019-07-01 PROCEDURE — 63600175 PHARM REV CODE 636 W HCPCS: Performed by: INTERNAL MEDICINE

## 2019-07-01 PROCEDURE — 78452 HT MUSCLE IMAGE SPECT MULT: CPT | Mod: 26,,, | Performed by: RADIOLOGY

## 2019-07-01 PROCEDURE — A9502 TC99M TETROFOSMIN: HCPCS

## 2019-07-01 PROCEDURE — 93306 TRANSTHORACIC ECHO (TTE) COMPLETE (CUPID ONLY): ICD-10-PCS | Mod: 26,,, | Performed by: INTERNAL MEDICINE

## 2019-07-01 PROCEDURE — 93306 TTE W/DOPPLER COMPLETE: CPT

## 2019-07-01 PROCEDURE — 93017 CV STRESS TEST TRACING ONLY: CPT

## 2019-07-01 RX ORDER — REGADENOSON 0.08 MG/ML
0.4 INJECTION, SOLUTION INTRAVENOUS ONCE
Status: COMPLETED | OUTPATIENT
Start: 2019-07-01 | End: 2019-07-01

## 2019-07-01 RX ADMIN — REGADENOSON 0.4 MG: 0.08 INJECTION, SOLUTION INTRAVENOUS at 08:07

## 2019-07-29 ENCOUNTER — TELEPHONE (OUTPATIENT)
Dept: HEMATOLOGY/ONCOLOGY | Facility: CLINIC | Age: 84
End: 2019-07-29

## 2019-07-29 NOTE — TELEPHONE ENCOUNTER
Called patient let him know INR 3.6 hold dose for 2 days restart at 2 mg   Recheck inr in 2 weeks     ----- Message from Doe Hernández MD sent at 7/23/2019  3:33 PM CDT -----  INR too high - need to hold x two days then resume at a lower doise and recheck in one week

## 2019-07-31 ENCOUNTER — OFFICE VISIT (OUTPATIENT)
Dept: HEMATOLOGY/ONCOLOGY | Facility: CLINIC | Age: 84
End: 2019-07-31
Payer: MEDICARE

## 2019-07-31 VITALS
WEIGHT: 225.69 LBS | TEMPERATURE: 98 F | DIASTOLIC BLOOD PRESSURE: 72 MMHG | HEART RATE: 71 BPM | SYSTOLIC BLOOD PRESSURE: 116 MMHG | RESPIRATION RATE: 20 BRPM | BODY MASS INDEX: 34.32 KG/M2

## 2019-07-31 DIAGNOSIS — Z15.89 HOMOZYGOUS MTHFR MUTATION C677T: ICD-10-CM

## 2019-07-31 DIAGNOSIS — Z79.01 LONG TERM (CURRENT) USE OF ANTICOAGULANTS: Primary | ICD-10-CM

## 2019-07-31 DIAGNOSIS — Z86.718 HISTORY OF DVT (DEEP VEIN THROMBOSIS): ICD-10-CM

## 2019-07-31 DIAGNOSIS — I82.402 ACUTE DEEP VEIN THROMBOSIS (DVT) OF LEFT LOWER EXTREMITY, UNSPECIFIED VEIN: ICD-10-CM

## 2019-07-31 DIAGNOSIS — Z86.711 HISTORY OF PULMONARY EMBOLUS (PE): ICD-10-CM

## 2019-07-31 DIAGNOSIS — I26.99 OTHER ACUTE PULMONARY EMBOLISM WITHOUT ACUTE COR PULMONALE: ICD-10-CM

## 2019-07-31 PROCEDURE — 99213 PR OFFICE/OUTPT VISIT, EST, LEVL III, 20-29 MIN: ICD-10-PCS | Mod: S$GLB,,, | Performed by: INTERNAL MEDICINE

## 2019-07-31 PROCEDURE — 3074F SYST BP LT 130 MM HG: CPT | Mod: S$GLB,,, | Performed by: INTERNAL MEDICINE

## 2019-07-31 PROCEDURE — 3078F PR MOST RECENT DIASTOLIC BLOOD PRESSURE < 80 MM HG: ICD-10-PCS | Mod: S$GLB,,, | Performed by: INTERNAL MEDICINE

## 2019-07-31 PROCEDURE — 3288F PR FALLS RISK ASSESSMENT DOCUMENTED: ICD-10-PCS | Mod: S$GLB,,, | Performed by: INTERNAL MEDICINE

## 2019-07-31 PROCEDURE — 3288F FALL RISK ASSESSMENT DOCD: CPT | Mod: S$GLB,,, | Performed by: INTERNAL MEDICINE

## 2019-07-31 PROCEDURE — 3074F PR MOST RECENT SYSTOLIC BLOOD PRESSURE < 130 MM HG: ICD-10-PCS | Mod: S$GLB,,, | Performed by: INTERNAL MEDICINE

## 2019-07-31 PROCEDURE — 3078F DIAST BP <80 MM HG: CPT | Mod: S$GLB,,, | Performed by: INTERNAL MEDICINE

## 2019-07-31 PROCEDURE — 1100F PTFALLS ASSESS-DOCD GE2>/YR: CPT | Mod: S$GLB,,, | Performed by: INTERNAL MEDICINE

## 2019-07-31 PROCEDURE — 1100F PR PT FALLS ASSESS DOC 2+ FALLS/FALL W/INJURY/YR: ICD-10-PCS | Mod: S$GLB,,, | Performed by: INTERNAL MEDICINE

## 2019-07-31 PROCEDURE — 99213 OFFICE O/P EST LOW 20 MIN: CPT | Mod: S$GLB,,, | Performed by: INTERNAL MEDICINE

## 2019-07-31 NOTE — PROGRESS NOTES
"[facility] Hematology/Oncology  PROGRESS NOTE -  Follow-up Visit      Subjective:       Patient ID:   NAME: Rafa Tompkins : 1935     84 y.o. male    Referring Doc: Noam  Other Physicians: Pretty/Corwin joshua, Julian; Dori        Chief Complaint:   PE/DVT f/u    History of Present Illness:     Patient returns today for a regularly scheduled follow-up visit.  The patient is here today to go over the results of the recently ordered labs, tests and studies. He is here by himself. He is doing ok with no new issues. He denies any CP, SOB, HA's or N/V. He has been on coumadin per direction of Dr Santacruz before and is now being monitored by ourselves. No excessive bleeding or bruising.  He had his coumadin adjusted the other day.             ROS:   GEN: normal without any fever, night sweats or weight loss  HEENT: normal with no HA's, sore throat, stiff neck, changes in vision  CV: normal with no CP, SOB, PND, JEFF or orthopnea  PULM: normal with no SOB, cough, hemoptysis, sputum or pleuritic pain  GI: normal with no abdominal pain, nausea, vomiting, constipation, diarrhea, melanotic stools, BRBPR, or hematemesis  : normal with no hematuria, dysuria  BREAST: normal with no mass, discharge, pain  SKIN: normal with no rash, erythema, bruising, or swelling    Allergies:  Review of patient's allergies indicates:   Allergen Reactions    Penicillins        Medications:    Current Outpatient Medications:     BD LUER-GEOFFREY SYRINGE 3 mL 20 x 1" Syrg, , Disp: , Rfl: 5    calcitRIOL (ROCALTROL) 0.25 MCG Cap, TK ONE C PO  Q WEEK, Disp: , Rfl: 4    doxazosin (CARDURA) 4 MG tablet, Take 4 mg by mouth once daily., Disp: , Rfl: 1    folic acid-vit B6-vit B12 2.5-25-2 mg (FOLBIC OR EQUIV) 2.5-25-2 mg Tab, Take 1 tablet by mouth once daily., Disp: 30 tablet, Rfl: 6    ketoconazole (NIZORAL) 2 % cream, Apply to itchy red scaly areas of the face twice daily, Disp: 60 g, Rfl: 3    ketoconazole (NIZORAL) 2 % shampoo, Wash hair " with medicated shampoo at least 2x/week - let sit on scalp at least 5 minutes prior to rinsing, Disp: 120 mL, Rfl: 5    lisinopril (PRINIVIL,ZESTRIL) 2.5 MG tablet, , Disp: , Rfl: 3    simvastatin (ZOCOR) 40 MG tablet, Take 40 mg by mouth every evening.  , Disp: , Rfl:     testosterone cypionate (DEPOTESTOTERONE CYPIONATE) 200 mg/mL injection, every 28 days. , Disp: , Rfl: 2    timolol maleate 0.5% (TIMOPTIC) 0.5 % Drop, , Disp: , Rfl: 1    TRETINOIN (RETIN-A TOP), Apply topically., Disp: , Rfl:     warfarin (COUMADIN) 2 MG tablet, 1 TABLET BY MOUTH EVERY SUNDAY, TUESDAY, THURSDAY, AND SATURDAY, Disp: , Rfl: 3    warfarin (COUMADIN) 4 MG tablet, Take 4 mg by mouth., Disp: , Rfl: 0    PMHx/PSHx Updates:  See patient's last visit with me on 5/8/2019.  See H&P on 2/12/2019        Pathology:  Cancer Staging  No matching staging information was found for the patient.          Objective:     Vitals:  Blood pressure 116/72, pulse 71, temperature 98.3 °F (36.8 °C), temperature source Oral, resp. rate 20, weight 102.4 kg (225 lb 11.2 oz).    Physical Examination:   GEN: no apparent distress, comfortable; AAOx3  HEAD: atraumatic and normocephalic  EYES: no pallor, no icterus, PERRLA  ENT: OMM, no pharyngeal erythema, external ears WNL; no nasal discharge; no thrush  NECK: no masses, thyroid normal, trachea midline, no LAD/LN's, supple  CV: RRR with + murmur; normal pulse; normal S1 and S2; no pedal edema  CHEST: Normal respiratory effort; CTAB; normal breath sounds; no wheeze or crackles  ABDOM: nontender and nondistended; soft; normal bowel sounds; no rebound/guarding  MUSC/Skeletal: ROM normal; no crepitus; joints normal; no deformities; arthropathy and uses cane to ambulate  EXTREM: no clubbing, cyanosis, inflammation; chronic swelling in LLE  SKIN: no rashes, lesions, ulcers, petechiae or subcutaneous nodules; chronic skin changes on feet  : no kowalski  NEURO: grossly intact; motor/sensory WNL; AAOx3; no  tremors  PSYCH: normal mood, affect and behavior  LYMPH: normal cervical, supraclavicular, axillary and groin LN's             Labs:     7/22/2019 INR    3.6         2/18/2019  Lab Results   Component Value Date    WBC 8.0 02/18/2019    HGB 13.9 02/18/2019    HCT 41.6 02/18/2019    MCV 95.0 02/18/2019     02/18/2019     CMP  Sodium   Date Value Ref Range Status   02/18/2019 137 135 - 146 mmol/L Final     Potassium   Date Value Ref Range Status   02/18/2019 4.6 3.5 - 5.3 mmol/L Final     Chloride   Date Value Ref Range Status   02/18/2019 103 98 - 110 mmol/L Final     CO2   Date Value Ref Range Status   02/18/2019 29 20 - 32 mmol/L Final     Glucose   Date Value Ref Range Status   02/18/2019 91 65 - 99 mg/dL Final     Comment:                   Fasting reference interval          BUN, Bld   Date Value Ref Range Status   02/18/2019 23 7 - 25 mg/dL Final     Creatinine   Date Value Ref Range Status   02/18/2019 1.38 (H) 0.70 - 1.11 mg/dL Final     Comment:     For patients >49 years of age, the reference limit  for Creatinine is approximately 13% higher for people  identified as -American.        11/19/2012 1.6 (H) 0.5 - 1.4 mg/dL Final     Calcium   Date Value Ref Range Status   02/18/2019 8.7 8.6 - 10.3 mg/dL Final   11/19/2012 8.5 (L) 8.7 - 10.5 mg/dL Final     Total Protein   Date Value Ref Range Status   02/18/2019 6.8 6.1 - 8.1 g/dL Final     Albumin   Date Value Ref Range Status   02/18/2019 3.7 3.6 - 5.1 g/dL Final     Total Bilirubin   Date Value Ref Range Status   02/18/2019 0.4 0.2 - 1.2 mg/dL Final     Alkaline Phosphatase   Date Value Ref Range Status   02/18/2019 56 40 - 115 U/L Final   11/19/2012 59 55 - 135 U/L Final     AST   Date Value Ref Range Status   02/18/2019 18 10 - 35 U/L Final   11/19/2012 27 10 - 40 U/L Final     ALT   Date Value Ref Range Status   02/18/2019 14 9 - 46 U/L Final     Anion Gap   Date Value Ref Range Status   10/12/2014 12 8 - 16 mmol/L Final   11/19/2012 10 5 -  15 meq/L Final     eGFR if    Date Value Ref Range Status   02/18/2019 55 (L) > OR = 60 mL/min/1.73m2 Final     eGFR if non    Date Value Ref Range Status   02/18/2019 47 (L) > OR = 60 mL/min/1.73m2 Final             Radiology/Diagnostic Studies:    No results found.    I have reviewed all available lab results and radiology reports.    Assessment/Plan:   (1) 84 y.o. male with diagnosis of pulmonary emboli and DVT who has been referred by Dr Santacruz for evaluation by medical hematology/oncology.   -  He was found to have a LLE DVT in the distal superficial femoral and popliteal veins back in Nov 2017.  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 Continue folbic  2. Continue management of the coumadin per patient's request and monitor the INR as instructed  3. I recommend consideration for continual  anticoagulation (possibly life-long)  4. F/u with cardiology  5. Check up to date basic labs    RTC in 3 months  Fax note to  Randy Santacruz MD, [facility]win Ram[facility]rez Lam      Discussion:       I spent over 25 mins of time with the patient. Reviewed results of the recently ordered labs, tests and studies; made directives with regards to the results. Over half of this time was spent couseling and coordinating care.    I have explained all of the above in detail and the patient understands all of the current recommendation(s). I have answered all of their questions to the best of my ability and to their complete satisfaction.   The patient is to continue with the current management plan.            Electronically signed by Doe Hernández MD

## 2019-08-08 ENCOUNTER — CLINICAL SUPPORT (OUTPATIENT)
Dept: CARDIAC REHAB | Facility: HOSPITAL | Age: 84
End: 2019-08-08

## 2019-08-12 ENCOUNTER — TELEPHONE (OUTPATIENT)
Dept: CARDIOLOGY | Facility: CLINIC | Age: 84
End: 2019-08-12

## 2019-08-12 NOTE — TELEPHONE ENCOUNTER
Call placed to Mr. Blount, left message with daughter to have him call back. No further issues noted.

## 2019-08-12 NOTE — TELEPHONE ENCOUNTER
----- Message from Nelida Wilde sent at 8/12/2019 11:52 AM CDT -----  Contact: self  Type:  Patient Requesting Referral    Who Called:  Patient   Does the patient already have the specialty appointment scheduled?:  no  If yes, what is the date of that appointment?:    Referral to What Specialty:  Cardic rehab in [place]  Reason for Referral:  Diagnosis with heart murmur per patient  ,will like to claim through [employer] per patient   Does the patient want the referral with a specific physician?:  [place] [facility]  Is the specialist an [facility]?:    Patient Requesting a Call Back?: Yes    Best Call Back Number:  [phone] (home) or daughter cell [phone] can leave message with daughter      Additional Information:

## 2019-08-12 NOTE — TELEPHONE ENCOUNTER
Call placed to Ms. Blount in regards to a referral to Cardiac Rehab. I advised him that I would send a message to Dr. Chahal, and let him know the outcome. He verbalized understanding. No further issues noted.

## 2019-08-13 ENCOUNTER — TELEPHONE (OUTPATIENT)
Dept: HEMATOLOGY/ONCOLOGY | Facility: CLINIC | Age: 84
End: 2019-08-13

## 2019-08-13 ENCOUNTER — CLINICAL SUPPORT (OUTPATIENT)
Dept: CARDIAC REHAB | Facility: HOSPITAL | Age: 84
End: 2019-08-13

## 2019-08-13 LAB
INR PPP: 1.5
PROTHROMBIN TIME: 14.8 SEC (ref 9–11.5)

## 2019-08-13 NOTE — TELEPHONE ENCOUNTER
Called talked to son he is going to have father call me back to get instructions for coumadin     ----- Message from Doe Hernández MD sent at 8/13/2019  8:18 AM CDT -----  INR is now low - need to adjust coumadin dose

## 2019-08-14 ENCOUNTER — TELEPHONE (OUTPATIENT)
Dept: HEMATOLOGY/ONCOLOGY | Facility: CLINIC | Age: 84
End: 2019-08-14

## 2019-08-14 NOTE — TELEPHONE ENCOUNTER
Patient called back told him to alternate between 4 mg and 2 mg a day   And recheck in 2 weeks

## 2019-08-15 ENCOUNTER — CLINICAL SUPPORT (OUTPATIENT)
Dept: CARDIAC REHAB | Facility: HOSPITAL | Age: 84
End: 2019-08-15

## 2019-08-15 DIAGNOSIS — Z78.9 PARTICIPANT IN HEALTH AND WELLNESS PLAN: Primary | ICD-10-CM

## 2019-08-16 ENCOUNTER — OFFICE VISIT (OUTPATIENT)
Dept: DERMATOLOGY | Facility: CLINIC | Age: 84
End: 2019-08-16
Payer: MEDICARE

## 2019-08-16 VITALS — HEIGHT: 68 IN | WEIGHT: 225.75 LBS | BODY MASS INDEX: 34.21 KG/M2

## 2019-08-16 DIAGNOSIS — L72.0 MILIA: ICD-10-CM

## 2019-08-16 DIAGNOSIS — Z85.828 HISTORY OF NONMELANOMA SKIN CANCER: ICD-10-CM

## 2019-08-16 DIAGNOSIS — L82.1 SEBORRHEIC KERATOSES: ICD-10-CM

## 2019-08-16 DIAGNOSIS — L57.0 ACTINIC KERATOSES: Primary | ICD-10-CM

## 2019-08-16 DIAGNOSIS — Z85.820 HISTORY OF MELANOMA: ICD-10-CM

## 2019-08-16 DIAGNOSIS — L21.9 SEBORRHEIC DERMATITIS: ICD-10-CM

## 2019-08-16 DIAGNOSIS — L81.4 SOLAR LENTIGO: ICD-10-CM

## 2019-08-16 PROCEDURE — 17003 DESTRUCTION, PREMALIGNANT LESIONS; SECOND THROUGH 14 LESIONS: ICD-10-PCS | Mod: S$GLB,,, | Performed by: DERMATOLOGY

## 2019-08-16 PROCEDURE — 99999 PR PBB SHADOW E&M-EST. PATIENT-LVL III: ICD-10-PCS | Mod: PBBFAC,,, | Performed by: DERMATOLOGY

## 2019-08-16 PROCEDURE — 1101F PT FALLS ASSESS-DOCD LE1/YR: CPT | Mod: CPTII,S$GLB,, | Performed by: DERMATOLOGY

## 2019-08-16 PROCEDURE — 99213 PR OFFICE/OUTPT VISIT, EST, LEVL III, 20-29 MIN: ICD-10-PCS | Mod: 25,S$GLB,, | Performed by: DERMATOLOGY

## 2019-08-16 PROCEDURE — 1101F PR PT FALLS ASSESS DOC 0-1 FALLS W/OUT INJ PAST YR: ICD-10-PCS | Mod: CPTII,S$GLB,, | Performed by: DERMATOLOGY

## 2019-08-16 PROCEDURE — 17000 PR DESTRUCTION(LASER SURGERY,CRYOSURGERY,CHEMOSURGERY),PREMALIGNANT LESIONS,FIRST LESION: ICD-10-PCS | Mod: S$GLB,,, | Performed by: DERMATOLOGY

## 2019-08-16 PROCEDURE — 17000 DESTRUCT PREMALG LESION: CPT | Mod: S$GLB,,, | Performed by: DERMATOLOGY

## 2019-08-16 PROCEDURE — 99213 OFFICE O/P EST LOW 20 MIN: CPT | Mod: 25,S$GLB,, | Performed by: DERMATOLOGY

## 2019-08-16 PROCEDURE — 17003 DESTRUCT PREMALG LES 2-14: CPT | Mod: S$GLB,,, | Performed by: DERMATOLOGY

## 2019-08-16 PROCEDURE — 99999 PR PBB SHADOW E&M-EST. PATIENT-LVL III: CPT | Mod: PBBFAC,,, | Performed by: DERMATOLOGY

## 2019-08-16 RX ORDER — NITROFURANTOIN 25; 75 MG/1; MG/1
100 CAPSULE ORAL
COMMUNITY
Start: 2018-03-27 | End: 2019-08-16 | Stop reason: ALTCHOICE

## 2019-08-16 RX ORDER — FLUTICASONE PROPIONATE 50 MCG
2 SPRAY, SUSPENSION (ML) NASAL DAILY
COMMUNITY
Start: 2017-02-20

## 2019-08-16 NOTE — PROGRESS NOTES
Subjective:       Patient ID:  Rafa Tompkins is a 84 y.o. male who presents for   Chief Complaint   Patient presents with    Skin Check     TBSE     Patient last seen 05/2019    Biopsy of isolated BCC like lesion 09/2018 with unexpected path of atypical lymphohistiocytic infiltrate on R upper back, monitoring site    H/o AKs treated with cryo and bx of small SCCIS left neck, neg biopsy margins, monitoring  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, neg bx margins, monitoring  Melanoma (C43.9) 2013 right side [facility]  Squamous cell carcinoma (C80.1) 2013 right lower leg    SCC (squamous cell carcinoma) (C44.92) 1995 left temple     in youth    This is a high risk patient here to check for the development of new lesions.  New complaints    FINAL PATHOLOGIC DIAGNOSIS  1. Skin, left upper arm, shave biopsy:  -LICHENOID KERATOSIS, see comment  COMMENT (part 1): The finding of a lichenoid dermatitis pattern in a solitary lesion is characteristic of a lichenoid  keratosis. Some of these lesions are secondary changes in seborrheic keratoses, solar lentigos, verrucae, and  other conditions. A similar histology with multiple lesions leads to a broader differential diagnosis , including lichen  planus and other lichenoid conditions. Correlation is suggested.  2. Skin, right upper back, shave biopsy:  -ATYPICAL LYMPHOHISTIOCYTIC INFILTRATE, see comment  COMMENT (part 2): Although the infiltrate is predominantly composed of T-cells (with an increased CD4 to CD8  ratio) and shows some loss of CD7, no clonal T-cell receptor gene rearrangement was detected (see [place] [facility]  report). 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 These proliferations may be caused by, but  not limited to, drug  reactions (usually anticonvulsant drugs or angiotensin-converting enzyme inhibitor), allergic  contact dermatitis, insect bites, actinic reticuloid, and idiopathic. Clinical correlation and follow up for recurrent or  other similar lesions is recommended.    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 + hypercoagulable state (Dr Johnson) he has MTHFR-C homozygous positive      Review of Systems   Constitutional: Negative for fever, chills and fatigue.   Skin: Positive for activity-related sunscreen use and wears hat. Negative for daily sunscreen use.   Hematologic/Lymphatic: Bruises/bleeds easily.        Objective:    Physical Exam   Constitutional: He appears well-developed and well-nourished. No distress.   Neurological: He is alert and oriented to person, place, and time. He is not disoriented.   Psychiatric: He has a normal mood and affect.   Skin:   Areas Examined (abnormalities noted in diagram):   Scalp / Hair Palpated and Inspected  Head / Face Inspection Performed  Neck Inspection Performed  Chest / Axilla Inspection Performed  Abdomen Inspection Performed  Back Inspection Performed  RUE Inspected  LUE Inspection Performed  Nails and Digits Inspection Performed                       Diagram Legend     Erythematous scaling macule/papule c/w actinic keratosis       Vascular papule c/w angioma      Pigmented verrucoid papule/plaque c/w seborrheic keratosis      Yellow umbilicated papule c/w sebaceous hyperplasia      Irregularly shaped tan macule c/w lentigo     1-2 mm smooth white papules consistent with Milia      Movable subcutaneous cyst with punctum c/w epidermal inclusion cyst      Subcutaneous movable cyst c/w pilar cyst      Firm pink to brown papule c/w dermatofibroma      Pedunculated fleshy papule(s) c/w skin tag(s)      Evenly pigmented macule c/w junctional nevus     Mildly variegated pigmented, slightly irregular-bordered macule c/w mildly atypical nevus      Flesh colored to evenly pigmented papule c/w intradermal  nevus       Pink pearly papule/plaque c/w basal cell carcinoma      Erythematous hyperkeratotic cursted plaque c/w SCC      Surgical scar with no sign of skin cancer recurrence      Open and closed comedones      Inflammatory papules and pustules      Verrucoid papule consistent consistent with wart     Erythematous eczematous patches and plaques     Dystrophic onycholytic nail with subungual debris c/w onychomycosis     Umbilicated papule    Erythematous-base heme-crusted tan verrucoid plaque consistent with inflamed seborrheic keratosis     Erythematous Silvery Scaling Plaque c/w Psoriasis     See annotation      Assessment / Plan:        Actinic keratoses  Cryosurgery Procedure Note    Verbal consent from the patient is obtained and the patient is aware of the precancerous quality and need for treatment of these lesions. Liquid nitrogen cryosurgery is applied to the 7 actinic keratoses, as detailed in the physical exam, to produce a freeze injury. The patient is aware that blisters may form and is instructed on wound care with gentle cleansing and use of vaseline ointment to keep moist until healed. The patient is supplied a handout on cryosurgery and is instructed to call if lesions do not completely resolve.    Seborrheic keratoses  These are benign inherited growths without a malignant potential. Reassurance given to patient. No treatment is necessary.     History of nonmelanoma skin cancer  History of melanoma (?)- R lateral canthus  Area of previous melanoma and NMSCs examined. Site well healed with no signs of recurrence.  Total body skin examination performed today including at least 12 points as noted in physical examination. No lesions suspicious for malignancy noted.    Milia/multiple small cysts, trunk, unchanged x decades    Seborrheic dermatitis  Continue keto shampoo    Solar lentigo  This is a benign hyperpigmented sun induced lesion. Daily sun protection will reduce the number of new lesions.  Treatment of these benign lesions are considered cosmetic.    Patient instructed in importance in daily sun protection of at least spf 30. Mineral sunscreen ingredients preferred (Zinc +/- Titanium).   Recommend Elta MD for daily use on face and neck.  Patient encouraged to wear hat for all outdoor exposure.   Also discussed sun avoidance and use of protective clothing.             Follow up in about 6 months (around 2/16/2020).

## 2019-08-16 NOTE — PATIENT INSTRUCTIONS

## 2019-08-20 ENCOUNTER — CLINICAL SUPPORT (OUTPATIENT)
Dept: CARDIAC REHAB | Facility: HOSPITAL | Age: 84
End: 2019-08-20

## 2019-08-22 ENCOUNTER — CLINICAL SUPPORT (OUTPATIENT)
Dept: CARDIAC REHAB | Facility: HOSPITAL | Age: 84
End: 2019-08-22

## 2019-08-22 DIAGNOSIS — Z78.9 PARTICIPANT IN HEALTH AND WELLNESS PLAN: ICD-10-CM

## 2019-08-22 PROCEDURE — 94300004 HC CARDIAC REHAB PHASE III, MONTHLY SESSION

## 2019-08-27 ENCOUNTER — CLINICAL SUPPORT (OUTPATIENT)
Dept: CARDIAC REHAB | Facility: HOSPITAL | Age: 84
End: 2019-08-27

## 2019-08-30 ENCOUNTER — CLINICAL SUPPORT (OUTPATIENT)
Dept: CARDIAC REHAB | Facility: HOSPITAL | Age: 84
End: 2019-08-30

## 2019-09-03 ENCOUNTER — TELEPHONE (OUTPATIENT)
Dept: HEMATOLOGY/ONCOLOGY | Facility: CLINIC | Age: 84
End: 2019-09-03

## 2019-09-03 ENCOUNTER — CLINICAL SUPPORT (OUTPATIENT)
Dept: CARDIAC REHAB | Facility: HOSPITAL | Age: 84
End: 2019-09-03

## 2019-09-03 PROCEDURE — 94300004 HC CARDIAC REHAB PHASE III, MONTHLY SESSION

## 2019-09-04 ENCOUNTER — TELEPHONE (OUTPATIENT)
Dept: HEMATOLOGY/ONCOLOGY | Facility: CLINIC | Age: 84
End: 2019-09-04

## 2019-09-05 ENCOUNTER — CLINICAL SUPPORT (OUTPATIENT)
Dept: CARDIAC REHAB | Facility: HOSPITAL | Age: 84
End: 2019-09-05

## 2019-09-05 DIAGNOSIS — Z78.9 PARTICIPANT IN HEALTH AND WELLNESS PLAN: Primary | ICD-10-CM

## 2019-09-05 PROCEDURE — 94300004 HC CARDIAC REHAB PHASE III, MONTHLY SESSION

## 2019-09-17 ENCOUNTER — CLINICAL SUPPORT (OUTPATIENT)
Dept: CARDIAC REHAB | Facility: HOSPITAL | Age: 84
End: 2019-09-17

## 2019-09-30 ENCOUNTER — TELEPHONE (OUTPATIENT)
Dept: HEMATOLOGY/ONCOLOGY | Facility: CLINIC | Age: 84
End: 2019-09-30

## 2019-09-30 NOTE — TELEPHONE ENCOUNTER
----- Message from Doe Hernández MD sent at 9/30/2019 11:32 AM CDT -----  INR looks adequate at this time        Spoke to Ray and let him know INR is at 2.4. He should continue alt. 2mg with 4 mg and recheck the INR in 4 weeks.

## 2019-10-30 NOTE — PROGRESS NOTES
"[facility] Hematology/Oncology  PROGRESS NOTE -  Follow-up Visit      Subjective:       Patient ID:   NAME: Rafa Tompkins : 1935     84 y.o. male    Referring Doc: Noam  Other Physicians: Pretty/Corwin joshua, Julian; Dori        Chief Complaint:   PE/DVT f/u    History of Present Illness:     Patient returns today for a regularly scheduled follow-up visit.  The patient is here today to go over the results of the recently ordered labs, tests and studies. He is here by himself. He is doing ok with no new issues. He denies any CP, SOB, HA's or N/V. He has been on coumadin per direction of Dr Santacruz before and is now being monitored by ourselves. No excessive bleeding or bruising.  He had his coumadin adjusted the other day.             ROS:   GEN: normal without any fever, night sweats or weight loss  HEENT: normal with no HA's, sore throat, stiff neck, changes in vision  CV: normal with no CP, SOB, PND, JEFF or orthopnea  PULM: normal with no SOB, cough, hemoptysis, sputum or pleuritic pain  GI: normal with no abdominal pain, nausea, vomiting, constipation, diarrhea, melanotic stools, BRBPR, or hematemesis  : normal with no hematuria, dysuria  BREAST: normal with no mass, discharge, pain  SKIN: normal with no rash, erythema, bruising, or swelling    Allergies:  Review of patient's allergies indicates:   Allergen Reactions    Penicillins        Medications:    Current Outpatient Medications:     BD LUER-GEOFFREY SYRINGE 3 mL 20 x 1" Syrg, , Disp: , Rfl: 5    calcitRIOL (ROCALTROL) 0.25 MCG Cap, TK ONE C PO  Q WEEK, Disp: , Rfl: 4    doxazosin (CARDURA) 4 MG tablet, Take 4 mg by mouth once daily., Disp: , Rfl: 1    fluticasone propionate (FLONASE) 50 mcg/actuation nasal spray, by Nasal route., Disp: , Rfl:     folic acid-vit B6-vit B12 2.5-25-2 mg (FOLBIC OR EQUIV) 2.5-25-2 mg Tab, Take 1 tablet by mouth once daily., Disp: 30 tablet, Rfl: 6    ketoconazole (NIZORAL) 2 % cream, Apply to itchy red scaly " areas of the face twice daily, Disp: 60 g, Rfl: 3    ketoconazole (NIZORAL) 2 % shampoo, Wash hair with medicated shampoo at least 2x/week - let sit on scalp at least 5 minutes prior to rinsing, Disp: 120 mL, Rfl: 5    lisinopril (PRINIVIL,ZESTRIL) 2.5 MG tablet, , Disp: , Rfl: 3    simvastatin (ZOCOR) 40 MG tablet, Take 40 mg by mouth every evening.  , Disp: , Rfl:     testosterone cypionate (DEPOTESTOTERONE CYPIONATE) 200 mg/mL injection, every 28 days. , Disp: , Rfl: 2    timolol maleate 0.5% (TIMOPTIC) 0.5 % Drop, , Disp: , Rfl: 1    TRETINOIN (RETIN-A TOP), Apply topically., Disp: , Rfl:     warfarin (COUMADIN) 2 MG tablet, 1 TABLET BY MOUTH EVERY SUNDAY, TUESDAY, THURSDAY, AND SATURDAY, Disp: , Rfl: 3    warfarin (COUMADIN) 4 MG tablet, Take 4 mg by mouth., Disp: , Rfl: 0    PMHx/PSHx Updates:  See patient's last visit with me on 7/31/2019.  See H&P on 2/12/2019        Pathology:  Cancer Staging  No matching staging information was found for the patient.          Objective:     Vitals:  Blood pressure 111/74, pulse 73, temperature 98 °F (36.7 °C), temperature source Oral, resp. rate 19, weight 101.4 kg (223 lb 9.6 oz).    Physical Examination:   GEN: no apparent distress, comfortable; AAOx3  HEAD: atraumatic and normocephalic  EYES: no pallor, no icterus, PERRLA  ENT: OMM, no pharyngeal erythema, external ears WNL; no nasal discharge; no thrush  NECK: no masses, thyroid normal, trachea midline, no LAD/LN's, supple  CV: RRR with + murmur; normal pulse; normal S1 and S2; no pedal edema  CHEST: Normal respiratory effort; CTAB; normal breath sounds; no wheeze or crackles  ABDOM: nontender and nondistended; soft; normal bowel sounds; no rebound/guarding  MUSC/Skeletal: ROM normal; no crepitus; joints normal; no deformities; arthropathy and uses cane to ambulate  EXTREM: no clubbing, cyanosis, inflammation; chronic swelling in LLE  SKIN: no rashes, lesions, ulcers, petechiae or subcutaneous nodules; chronic  skin changes on feet  : no kowalski  NEURO: grossly intact; motor/sensory WNL; AAOx3; no tremors  PSYCH: normal mood, affect and behavior  LYMPH: normal cervical, supraclavicular, axillary and groin LN's             Labs:     10/28/2019  Lab Results   Component Value Date    WBC 8.3 10/28/2019    HGB 14.7 10/28/2019    HCT 43.9 10/28/2019    MCV 97.6 10/28/2019     10/28/2019     CMP  Sodium   Date Value Ref Range Status   10/28/2019 140 135 - 146 mmol/L Final     Potassium   Date Value Ref Range Status   10/28/2019 4.5 3.5 - 5.3 mmol/L Final     Chloride   Date Value Ref Range Status   10/28/2019 106 98 - 110 mmol/L Final     CO2   Date Value Ref Range Status   10/28/2019 26 20 - 32 mmol/L Final     Glucose   Date Value Ref Range Status   10/28/2019 88 65 - 139 mg/dL Final     Comment:               Non-fasting reference interval          BUN, Bld   Date Value Ref Range Status   10/28/2019 28 (H) 7 - 25 mg/dL Final     Creatinine   Date Value Ref Range Status   10/28/2019 1.51 (H) 0.70 - 1.11 mg/dL Final     Comment:     For patients >49 years of age, the reference limit  for Creatinine is approximately 13% higher for people  identified as -American.        11/19/2012 1.6 (H) 0.5 - 1.4 mg/dL Final     Calcium   Date Value Ref Range Status   10/28/2019 9.3 8.6 - 10.3 mg/dL Final   11/19/2012 8.5 (L) 8.7 - 10.5 mg/dL Final     Total Protein   Date Value Ref Range Status   10/28/2019 6.9 6.1 - 8.1 g/dL Final     Albumin   Date Value Ref Range Status   10/28/2019 4.0 3.6 - 5.1 g/dL Final     Total Bilirubin   Date Value Ref Range Status   10/28/2019 0.5 0.2 - 1.2 mg/dL Final     Alkaline Phosphatase   Date Value Ref Range Status   10/28/2019 51 40 - 115 U/L Final   11/19/2012 59 55 - 135 U/L Final     AST   Date Value Ref Range Status   10/28/2019 23 10 - 35 U/L Final   11/19/2012 27 10 - 40 U/L Final     ALT   Date Value Ref Range Status   10/28/2019 19 9 - 46 U/L Final     Anion Gap   Date Value Ref  Range Status   10/12/2014 12 8 - 16 mmol/L Final   11/19/2012 10 5 - 15 meq/L Final     eGFR if    Date Value Ref Range Status   10/28/2019 49 (L) > OR = 60 mL/min/1.73m2 Final     eGFR if non    Date Value Ref Range Status   10/28/2019 42 (L) > OR = 60 mL/min/1.73m2 Final       Protime-INR   Order: [id number]   Status:  Final result   Visible to patient:  No (Not Released) Next appt:  None Dx:  Long term (current) use of anticoagul...    Ref Range & Units 3d ago 1mo ago   INR  2.0High                   Radiology/Diagnostic Studies:    No results found.    I have reviewed all available lab results and radiology reports.    Assessment/Plan:   (1) 84 y.o. male with diagnosis of pulmonary emboli and DVT who has been referred by Dr Santacruz for evaluation by medical hematology/oncology.   -  He was found to have a LLE DVT in the distal superficial femoral and popliteal veins back in Nov 2017.  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 Continue folbic  2. Continue management of the coumadin per patient's request and monitor the INR as instructed  3. I recommend consideration for continual anticoagulation (possibly life-long)  4. F/u with cardiology       RTC in 4 months  Fax note to  Randy Santacruz MD, [facility] Ram[facility]rez Lam      Discussion:       I spent over 25 mins of time with the patient. Reviewed results of the recently ordered labs, tests and studies; made directives with regards to the results. Over half of this time was spent couseling and coordinating care.    I have explained all of the above in detail and the patient understands all of the current recommendation(s). I have answered all of their questions to the best of my ability and to their complete satisfaction.   The patient is to continue with the current management plan.            Electronically signed by Doe Hernández MD

## 2019-10-31 ENCOUNTER — CLINICAL SUPPORT (OUTPATIENT)
Dept: CARDIAC REHAB | Facility: HOSPITAL | Age: 84
End: 2019-10-31
Attending: INTERNAL MEDICINE

## 2019-10-31 ENCOUNTER — OFFICE VISIT (OUTPATIENT)
Dept: HEMATOLOGY/ONCOLOGY | Facility: CLINIC | Age: 84
End: 2019-10-31
Payer: MEDICARE

## 2019-10-31 VITALS
RESPIRATION RATE: 19 BRPM | SYSTOLIC BLOOD PRESSURE: 111 MMHG | WEIGHT: 223.63 LBS | DIASTOLIC BLOOD PRESSURE: 74 MMHG | TEMPERATURE: 98 F | HEART RATE: 73 BPM | BODY MASS INDEX: 34 KG/M2

## 2019-10-31 DIAGNOSIS — I26.99 OTHER ACUTE PULMONARY EMBOLISM WITHOUT ACUTE COR PULMONALE: ICD-10-CM

## 2019-10-31 DIAGNOSIS — Z15.89 HOMOZYGOUS MTHFR MUTATION C677T: ICD-10-CM

## 2019-10-31 DIAGNOSIS — Z86.711 HISTORY OF PULMONARY EMBOLUS (PE): Primary | ICD-10-CM

## 2019-10-31 DIAGNOSIS — I82.402 ACUTE DEEP VEIN THROMBOSIS (DVT) OF LEFT LOWER EXTREMITY, UNSPECIFIED VEIN: ICD-10-CM

## 2019-10-31 DIAGNOSIS — Z79.01 LONG TERM (CURRENT) USE OF ANTICOAGULANTS: ICD-10-CM

## 2019-10-31 DIAGNOSIS — Z86.718 HISTORY OF DVT (DEEP VEIN THROMBOSIS): ICD-10-CM

## 2019-10-31 DIAGNOSIS — Z78.9 PARTICIPANT IN HEALTH AND WELLNESS PLAN: ICD-10-CM

## 2019-10-31 PROCEDURE — 1101F PT FALLS ASSESS-DOCD LE1/YR: CPT | Mod: S$GLB,,, | Performed by: INTERNAL MEDICINE

## 2019-10-31 PROCEDURE — 3074F PR MOST RECENT SYSTOLIC BLOOD PRESSURE < 130 MM HG: ICD-10-PCS | Mod: S$GLB,,, | Performed by: INTERNAL MEDICINE

## 2019-10-31 PROCEDURE — 3078F DIAST BP <80 MM HG: CPT | Mod: S$GLB,,, | Performed by: INTERNAL MEDICINE

## 2019-10-31 PROCEDURE — 3074F SYST BP LT 130 MM HG: CPT | Mod: S$GLB,,, | Performed by: INTERNAL MEDICINE

## 2019-10-31 PROCEDURE — 1101F PR PT FALLS ASSESS DOC 0-1 FALLS W/OUT INJ PAST YR: ICD-10-PCS | Mod: S$GLB,,, | Performed by: INTERNAL MEDICINE

## 2019-10-31 PROCEDURE — 99213 OFFICE O/P EST LOW 20 MIN: CPT | Mod: S$GLB,,, | Performed by: INTERNAL MEDICINE

## 2019-10-31 PROCEDURE — 99213 PR OFFICE/OUTPT VISIT, EST, LEVL III, 20-29 MIN: ICD-10-PCS | Mod: S$GLB,,, | Performed by: INTERNAL MEDICINE

## 2019-10-31 PROCEDURE — 3078F PR MOST RECENT DIASTOLIC BLOOD PRESSURE < 80 MM HG: ICD-10-PCS | Mod: S$GLB,,, | Performed by: INTERNAL MEDICINE

## 2019-10-31 PROCEDURE — 94300004 HC CARDIAC REHAB PHASE III, MONTHLY SESSION

## 2019-12-04 ENCOUNTER — CLINICAL SUPPORT (OUTPATIENT)
Dept: CARDIAC REHAB | Facility: HOSPITAL | Age: 84
End: 2019-12-04
Attending: INTERNAL MEDICINE

## 2019-12-04 DIAGNOSIS — Z78.9 PARTICIPANT IN HEALTH AND WELLNESS PLAN: ICD-10-CM

## 2019-12-04 PROCEDURE — 94300004 HC CARDIAC REHAB PHASE III, MONTHLY SESSION

## 2019-12-05 ENCOUNTER — TELEPHONE (OUTPATIENT)
Dept: HEMATOLOGY/ONCOLOGY | Facility: CLINIC | Age: 84
End: 2019-12-05

## 2019-12-05 NOTE — TELEPHONE ENCOUNTER
Called the patient with INR results of 2.8.  Instructed the patient to keep same dose of Coumadin 4/2 mg and re-check INR in 4 weeks.

## 2019-12-05 NOTE — TELEPHONE ENCOUNTER
----- Message from Doe Hernández MD sent at 12/5/2019 11:18 AM CST -----  INR is adequate - check again in 4 weeks

## 2020-01-01 ENCOUNTER — TELEPHONE (OUTPATIENT)
Dept: HEMATOLOGY/ONCOLOGY | Facility: CLINIC | Age: 85
End: 2020-01-01

## 2020-01-01 ENCOUNTER — OFFICE VISIT (OUTPATIENT)
Dept: HEMATOLOGY/ONCOLOGY | Facility: CLINIC | Age: 85
End: 2020-01-01
Payer: MEDICARE

## 2020-01-01 ENCOUNTER — OFFICE VISIT (OUTPATIENT)
Dept: DERMATOLOGY | Facility: CLINIC | Age: 85
End: 2020-01-01
Payer: MEDICARE

## 2020-01-01 VITALS
TEMPERATURE: 96 F | DIASTOLIC BLOOD PRESSURE: 68 MMHG | BODY MASS INDEX: 34.67 KG/M2 | HEART RATE: 71 BPM | RESPIRATION RATE: 19 BRPM | SYSTOLIC BLOOD PRESSURE: 133 MMHG | WEIGHT: 228 LBS

## 2020-01-01 VITALS
BODY MASS INDEX: 33.6 KG/M2 | WEIGHT: 221 LBS | DIASTOLIC BLOOD PRESSURE: 72 MMHG | HEART RATE: 97 BPM | SYSTOLIC BLOOD PRESSURE: 135 MMHG | TEMPERATURE: 97 F | RESPIRATION RATE: 18 BRPM

## 2020-01-01 VITALS — HEIGHT: 68 IN | BODY MASS INDEX: 34.56 KG/M2 | WEIGHT: 228 LBS

## 2020-01-01 DIAGNOSIS — C44.92 SCC (SQUAMOUS CELL CARCINOMA): ICD-10-CM

## 2020-01-01 DIAGNOSIS — Z15.89 HOMOZYGOUS MTHFR MUTATION C677T: ICD-10-CM

## 2020-01-01 DIAGNOSIS — Z85.828 HISTORY OF NONMELANOMA SKIN CANCER: ICD-10-CM

## 2020-01-01 DIAGNOSIS — Z79.01 LONG TERM (CURRENT) USE OF ANTICOAGULANTS: ICD-10-CM

## 2020-01-01 DIAGNOSIS — Z85.820 HISTORY OF MELANOMA: ICD-10-CM

## 2020-01-01 DIAGNOSIS — I82.402 ACUTE DEEP VEIN THROMBOSIS (DVT) OF LEFT LOWER EXTREMITY, UNSPECIFIED VEIN: ICD-10-CM

## 2020-01-01 DIAGNOSIS — I26.99 OTHER ACUTE PULMONARY EMBOLISM WITHOUT ACUTE COR PULMONALE: ICD-10-CM

## 2020-01-01 DIAGNOSIS — Z86.718 HISTORY OF DVT (DEEP VEIN THROMBOSIS): ICD-10-CM

## 2020-01-01 DIAGNOSIS — Z86.711 HISTORY OF PULMONARY EMBOLUS (PE): ICD-10-CM

## 2020-01-01 DIAGNOSIS — D48.5 NEOPLASM OF UNCERTAIN BEHAVIOR OF SKIN: Primary | ICD-10-CM

## 2020-01-01 DIAGNOSIS — L57.0 ACTINIC KERATOSES: ICD-10-CM

## 2020-01-01 DIAGNOSIS — L57.0 ACTINIC KERATOSES: Primary | ICD-10-CM

## 2020-01-01 DIAGNOSIS — L72.0 MILIA: ICD-10-CM

## 2020-01-01 DIAGNOSIS — Z86.718 HISTORY OF DVT (DEEP VEIN THROMBOSIS): Primary | ICD-10-CM

## 2020-01-01 DIAGNOSIS — L82.1 SEBORRHEIC KERATOSES: ICD-10-CM

## 2020-01-01 DIAGNOSIS — Z86.711 HISTORY OF PULMONARY EMBOLUS (PE): Primary | ICD-10-CM

## 2020-01-01 LAB
FINAL PATHOLOGIC DIAGNOSIS: NORMAL
GROSS: NORMAL

## 2020-01-01 PROCEDURE — 11102 TANGNTL BX SKIN SINGLE LES: CPT | Mod: S$GLB,,, | Performed by: DERMATOLOGY

## 2020-01-01 PROCEDURE — 1126F AMNT PAIN NOTED NONE PRSNT: CPT | Mod: S$GLB,,, | Performed by: INTERNAL MEDICINE

## 2020-01-01 PROCEDURE — 99214 PR OFFICE/OUTPT VISIT, EST, LEVL IV, 30-39 MIN: ICD-10-PCS | Mod: 25,S$GLB,, | Performed by: DERMATOLOGY

## 2020-01-01 PROCEDURE — 99213 PR OFFICE/OUTPT VISIT, EST, LEVL III, 20-29 MIN: ICD-10-PCS | Mod: S$GLB,,, | Performed by: INTERNAL MEDICINE

## 2020-01-01 PROCEDURE — 17000 DESTRUCT PREMALG LESION: CPT | Mod: 59,S$GLB,, | Performed by: DERMATOLOGY

## 2020-01-01 PROCEDURE — 1159F PR MEDICATION LIST DOCUMENTED IN MEDICAL RECORD: ICD-10-PCS | Mod: S$GLB,,, | Performed by: DERMATOLOGY

## 2020-01-01 PROCEDURE — 99999 PR PBB SHADOW E&M-EST. PATIENT-LVL III: CPT | Mod: PBBFAC,,, | Performed by: DERMATOLOGY

## 2020-01-01 PROCEDURE — 3288F PR FALLS RISK ASSESSMENT DOCUMENTED: ICD-10-PCS | Mod: S$GLB,,, | Performed by: INTERNAL MEDICINE

## 2020-01-01 PROCEDURE — 17000 PR DESTRUCTION(LASER SURGERY,CRYOSURGERY,CHEMOSURGERY),PREMALIGNANT LESIONS,FIRST LESION: ICD-10-PCS | Mod: 59,S$GLB,, | Performed by: DERMATOLOGY

## 2020-01-01 PROCEDURE — 99213 OFFICE O/P EST LOW 20 MIN: CPT | Mod: S$GLB,,, | Performed by: INTERNAL MEDICINE

## 2020-01-01 PROCEDURE — 1159F MED LIST DOCD IN RCRD: CPT | Mod: S$GLB,,, | Performed by: INTERNAL MEDICINE

## 2020-01-01 PROCEDURE — 17262 PR DESTR MALIG TRUNK,EXTREM 1.1-2 CM: ICD-10-PCS | Mod: S$GLB,,, | Performed by: DERMATOLOGY

## 2020-01-01 PROCEDURE — 1101F PR PT FALLS ASSESS DOC 0-1 FALLS W/OUT INJ PAST YR: ICD-10-PCS | Mod: S$GLB,,, | Performed by: INTERNAL MEDICINE

## 2020-01-01 PROCEDURE — 3075F PR MOST RECENT SYSTOLIC BLOOD PRESS GE 130-139MM HG: ICD-10-PCS | Mod: S$GLB,,, | Performed by: INTERNAL MEDICINE

## 2020-01-01 PROCEDURE — 1159F PR MEDICATION LIST DOCUMENTED IN MEDICAL RECORD: ICD-10-PCS | Mod: S$GLB,,, | Performed by: INTERNAL MEDICINE

## 2020-01-01 PROCEDURE — 88305 TISSUE EXAM BY PATHOLOGIST: ICD-10-PCS | Mod: 26,,, | Performed by: PATHOLOGY

## 2020-01-01 PROCEDURE — 1126F AMNT PAIN NOTED NONE PRSNT: CPT | Mod: S$GLB,,, | Performed by: DERMATOLOGY

## 2020-01-01 PROCEDURE — 3288F FALL RISK ASSESSMENT DOCD: CPT | Mod: S$GLB,,, | Performed by: INTERNAL MEDICINE

## 2020-01-01 PROCEDURE — 1126F PR PAIN SEVERITY QUANTIFIED, NO PAIN PRESENT: ICD-10-PCS | Mod: S$GLB,,, | Performed by: DERMATOLOGY

## 2020-01-01 PROCEDURE — 3075F SYST BP GE 130 - 139MM HG: CPT | Mod: S$GLB,,, | Performed by: INTERNAL MEDICINE

## 2020-01-01 PROCEDURE — 1101F PT FALLS ASSESS-DOCD LE1/YR: CPT | Mod: CPTII,S$GLB,, | Performed by: DERMATOLOGY

## 2020-01-01 PROCEDURE — 3078F DIAST BP <80 MM HG: CPT | Mod: S$GLB,,, | Performed by: INTERNAL MEDICINE

## 2020-01-01 PROCEDURE — 99999 PR PBB SHADOW E&M-EST. PATIENT-LVL III: ICD-10-PCS | Mod: PBBFAC,,, | Performed by: DERMATOLOGY

## 2020-01-01 PROCEDURE — 3078F PR MOST RECENT DIASTOLIC BLOOD PRESSURE < 80 MM HG: ICD-10-PCS | Mod: S$GLB,,, | Performed by: INTERNAL MEDICINE

## 2020-01-01 PROCEDURE — 17262 DSTRJ MAL LES T/A/L 1.1-2.0: CPT | Mod: S$GLB,,, | Performed by: DERMATOLOGY

## 2020-01-01 PROCEDURE — 88305 TISSUE EXAM BY PATHOLOGIST: CPT | Performed by: PATHOLOGY

## 2020-01-01 PROCEDURE — 1101F PR PT FALLS ASSESS DOC 0-1 FALLS W/OUT INJ PAST YR: ICD-10-PCS | Mod: CPTII,S$GLB,, | Performed by: DERMATOLOGY

## 2020-01-01 PROCEDURE — 17003 DESTRUCTION, PREMALIGNANT LESIONS; SECOND THROUGH 14 LESIONS: ICD-10-PCS | Mod: 59,S$GLB,, | Performed by: DERMATOLOGY

## 2020-01-01 PROCEDURE — 1159F MED LIST DOCD IN RCRD: CPT | Mod: S$GLB,,, | Performed by: DERMATOLOGY

## 2020-01-01 PROCEDURE — 17003 DESTRUCT PREMALG LES 2-14: CPT | Mod: 59,S$GLB,, | Performed by: DERMATOLOGY

## 2020-01-01 PROCEDURE — 1126F PR PAIN SEVERITY QUANTIFIED, NO PAIN PRESENT: ICD-10-PCS | Mod: S$GLB,,, | Performed by: INTERNAL MEDICINE

## 2020-01-01 PROCEDURE — 99499 UNLISTED E&M SERVICE: CPT | Mod: S$GLB,,, | Performed by: DERMATOLOGY

## 2020-01-01 PROCEDURE — 1101F PT FALLS ASSESS-DOCD LE1/YR: CPT | Mod: S$GLB,,, | Performed by: INTERNAL MEDICINE

## 2020-01-01 PROCEDURE — 88305 TISSUE EXAM BY PATHOLOGIST: CPT | Mod: 26,,, | Performed by: PATHOLOGY

## 2020-01-01 PROCEDURE — 99214 OFFICE O/P EST MOD 30 MIN: CPT | Mod: 25,S$GLB,, | Performed by: DERMATOLOGY

## 2020-01-01 PROCEDURE — 99499 NO LOS: ICD-10-PCS | Mod: S$GLB,,, | Performed by: DERMATOLOGY

## 2020-01-01 PROCEDURE — 11102 PR TANGENTIAL BIOPSY, SKIN, SINGLE LESION: ICD-10-PCS | Mod: S$GLB,,, | Performed by: DERMATOLOGY

## 2020-01-06 ENCOUNTER — CLINICAL SUPPORT (OUTPATIENT)
Dept: CARDIAC REHAB | Facility: HOSPITAL | Age: 85
End: 2020-01-06
Attending: INTERNAL MEDICINE

## 2020-01-06 DIAGNOSIS — Z78.9 PARTICIPANT IN HEALTH AND WELLNESS PLAN: ICD-10-CM

## 2020-01-06 PROCEDURE — 94300004 HC CARDIAC REHAB PHASE III, MONTHLY SESSION

## 2020-01-07 ENCOUNTER — TELEPHONE (OUTPATIENT)
Dept: HEMATOLOGY/ONCOLOGY | Facility: CLINIC | Age: 85
End: 2020-01-07

## 2020-01-07 NOTE — TELEPHONE ENCOUNTER
Called the patient and instructed him that his INR is 2.5.  Patient is taking Coumadin alternating 4mg/2mg.  Will re-check in 1 month.

## 2020-01-21 DIAGNOSIS — I82.402 ACUTE DEEP VEIN THROMBOSIS (DVT) OF LEFT LOWER EXTREMITY, UNSPECIFIED VEIN: Primary | ICD-10-CM

## 2020-01-22 RX ORDER — WARFARIN 4 MG/1
TABLET ORAL
Qty: 30 TABLET | Refills: 6 | Status: SHIPPED | OUTPATIENT
Start: 2020-01-22 | End: 2021-01-01

## 2020-02-04 ENCOUNTER — TELEPHONE (OUTPATIENT)
Dept: HEMATOLOGY/ONCOLOGY | Facility: CLINIC | Age: 85
End: 2020-02-04

## 2020-02-04 DIAGNOSIS — Z15.89 HOMOZYGOUS MTHFR MUTATION C677T: ICD-10-CM

## 2020-02-04 DIAGNOSIS — Z79.01 LONG TERM (CURRENT) USE OF ANTICOAGULANTS: ICD-10-CM

## 2020-02-04 DIAGNOSIS — Z86.718 HISTORY OF DVT (DEEP VEIN THROMBOSIS): ICD-10-CM

## 2020-02-04 DIAGNOSIS — Z86.711 HISTORY OF PULMONARY EMBOLUS (PE): Primary | ICD-10-CM

## 2020-02-04 NOTE — TELEPHONE ENCOUNTER
Called the patient and instructed him that he needs his INR checked this week.  Patient stated that he has some blood work for any MD and can he wait until 2 weeks to have it done.  I instructed him that he needs his INR checked this week because we have to monitor his INR closely because he has risk of bleeding if he is getting to much Coumadin.  Patient stated that he will go tomorrow.

## 2020-02-04 NOTE — TELEPHONE ENCOUNTER
----- Message from Oliva Andrews sent at 2/4/2020 10:41 AM CST -----  Please advise when patient needs to have his PT INR rechecked. Please call

## 2020-02-06 LAB
INR PPP: 2
PROTHROMBIN TIME: 20.1 SEC (ref 9–11.5)

## 2020-02-07 ENCOUNTER — TELEPHONE (OUTPATIENT)
Dept: HEMATOLOGY/ONCOLOGY | Facility: CLINIC | Age: 85
End: 2020-02-07

## 2020-02-07 NOTE — TELEPHONE ENCOUNTER
----- Message from Doe Hernández MD sent at 2/6/2020 10:42 AM CST -----  Are we managing his coumadin or is some one else ?        Spoke to patient. Continue same coumadin dose alternating 4mg with 2mg. Recheck INR in 4 weeks.

## 2020-02-18 LAB
INR PPP: 1.9
PROTHROMBIN TIME: 19.6 SEC (ref 9–11.5)

## 2020-02-21 ENCOUNTER — TELEPHONE (OUTPATIENT)
Dept: HEMATOLOGY/ONCOLOGY | Facility: CLINIC | Age: 85
End: 2020-02-21

## 2020-02-21 NOTE — TELEPHONE ENCOUNTER
----- Message from Doe Hernández MD sent at 2/18/2020  9:51 AM CST -----  Are we the ones monitoring his INR or is Dr aiken?

## 2020-02-21 NOTE — TELEPHONE ENCOUNTER
Called the patient with his INR 1.9.  Spoke with Amanda Pham NP and to increase the 2 mg to 2.5 mg.  Patient will be taking Coumadin 4 mg alternating 2.5 mg.  Re-check his INR next week.

## 2020-03-04 DIAGNOSIS — Z79.01 LONG TERM (CURRENT) USE OF ANTICOAGULANTS: Primary | ICD-10-CM

## 2020-03-05 ENCOUNTER — TELEPHONE (OUTPATIENT)
Dept: HEMATOLOGY/ONCOLOGY | Facility: CLINIC | Age: 85
End: 2020-03-05

## 2020-03-05 RX ORDER — WARFARIN 2 MG/1
TABLET ORAL
Qty: 30 TABLET | Refills: 1 | Status: SHIPPED | OUTPATIENT
Start: 2020-03-05

## 2020-03-05 NOTE — TELEPHONE ENCOUNTER
----- Message from Doe Hernández MD sent at 3/4/2020  9:57 AM CST -----  Are we monitoring his coumadin? Level is a little low

## 2020-03-05 NOTE — TELEPHONE ENCOUNTER
Called the patient and instructed him that his INR is 1.9.  Patient states that he did not have the 2 mg and has just been taking 1.5.  Instructed him that he needs to call the office when he is low on his Coumadin.  While talking with the patient he instructed me that Dr. Santacruz's office does his Coumadin.  Called Dr. Santacruz's office and spoke with Desire.  She instructed me that Dr. Santacruz's office is monitoring the Coumadin.

## 2020-03-18 ENCOUNTER — TELEPHONE (OUTPATIENT)
Dept: HEMATOLOGY/ONCOLOGY | Facility: CLINIC | Age: 85
End: 2020-03-18

## 2020-03-18 NOTE — TELEPHONE ENCOUNTER
----- Message from Doe Hernández MD sent at 3/17/2020 12:51 PM CDT -----  Are we managing his coumadin?

## 2020-03-23 ENCOUNTER — TELEPHONE (OUTPATIENT)
Dept: HEMATOLOGY/ONCOLOGY | Facility: CLINIC | Age: 85
End: 2020-03-23

## 2020-03-23 NOTE — TELEPHONE ENCOUNTER
Patient called and wanted to know what to do about his Coumadin.  I instructed him that when I talked to the patient last time he told me that Dr. Santacruz's office gave him the instructions.  Instructed him that Dr. Santacruz is managing his Coumadin.

## 2020-03-26 ENCOUNTER — TELEPHONE (OUTPATIENT)
Dept: HEMATOLOGY/ONCOLOGY | Facility: CLINIC | Age: 85
End: 2020-03-26

## 2020-03-26 NOTE — TELEPHONE ENCOUNTER
----- Message from Essie Hooks sent at 3/26/2020 11:18 AM CDT -----  The patient called about his results of his PT INR. He said that Dr Hernández is managing his coumadin and that he would like a call back to resolve whatever is going on. Please call him back at [phone] or his daughter Adrienne at [phone].

## 2020-03-26 NOTE — TELEPHONE ENCOUNTER
Called the patient and instructed him that we will monitor his Coumadin.  Called the patient and instructed him that his INR is 1.4.  Dr. Hernández wants him to take Coumadin 4 mg daily and re-check his INR on Monday 3/30/2020

## 2020-04-03 NOTE — PROGRESS NOTES
"[facility] Hematology/Oncology  PROGRESS NOTE -  Telemedicine Visit      Subjective:       Patient ID:   NAME: Rafa Tompkins : 1935     85 y.o. male    Referring Doc: Noam  Other Physicians: Pretty/Corwin joshua Shafor; Dori        Chief Complaint:   PE/DVT f/u    History of Present Illness:     Patient is being seen today via a telemedicine follow-up visit in lieu of a normal in-person visit due to the recent COVID19 outbreak. The patient is currently located at home. This visit type is a virtual visit with synchronous audio with video. His wife was in on the visit as well.      The patient is here today to go over the results of the recently ordered labs, tests and studies. He is here by himself. He is doing ok with no new issues. He denies any CP, SOB, HA's or N/V. He has been on coumadin per our direction. No excessive bleeding or bruising.       He had on coumadin 4mg po daily but for the past week he has been alternating 4mg and 2 mg po QOD. He has not had any repeat INR's done because he is afariad to go out with the current COVID19 epidemic.    I will try to set him up with an at-home PT/INR monitor    Discussed COVI19 precautions            ROS:   GEN: normal without any fever, night sweats or weight loss  HEENT: normal with no HA's, sore throat, stiff neck, changes in vision  CV: normal with no CP, SOB, PND, JEFF or orthopnea  PULM: normal with no SOB, cough, hemoptysis, sputum or pleuritic pain  GI: normal with no abdominal pain, nausea, vomiting, constipation, diarrhea, melanotic stools, BRBPR, or hematemesis  : normal with no hematuria, dysuria  BREAST: normal with no mass, discharge, pain  SKIN: normal with no rash, erythema, bruising, or swelling    Allergies:  Review of patient's allergies indicates:   Allergen Reactions    Penicillins        Medications:    Current Outpatient Medications:     BD LUER-GEOFFREY SYRINGE 3 mL 20 x 1" Syrg, , Disp: , Rfl: 5    calcitRIOL (ROCALTROL) 0.25 MCG " Cap, TK ONE C PO  Q WEEK, Disp: , Rfl: 4    doxazosin (CARDURA) 4 MG tablet, Take 4 mg by mouth once daily., Disp: , Rfl: 1    fluticasone propionate (FLONASE) 50 mcg/actuation nasal spray, by Nasal route., Disp: , Rfl:     folic acid-vit B6-vit B12 2.5-25-2 mg (FOLBIC OR EQUIV) 2.5-25-2 mg Tab, Take 1 tablet by mouth once daily., Disp: 30 tablet, Rfl: 6    ketoconazole (NIZORAL) 2 % cream, Apply to itchy red scaly areas of the face twice daily, Disp: 60 g, Rfl: 3    ketoconazole (NIZORAL) 2 % shampoo, Wash hair with medicated shampoo at least 2x/week - let sit on scalp at least 5 minutes prior to rinsing, Disp: 120 mL, Rfl: 5    lisinopril (PRINIVIL,ZESTRIL) 2.5 MG tablet, , Disp: , Rfl: 3    simvastatin (ZOCOR) 40 MG tablet, Take 40 mg by mouth every evening.  , Disp: , Rfl:     testosterone cypionate (DEPOTESTOTERONE CYPIONATE) 200 mg/mL injection, every 28 days. , Disp: , Rfl: 2    timolol maleate 0.5% (TIMOPTIC) 0.5 % Drop, , Disp: , Rfl: 1    TRETINOIN (RETIN-A TOP), Apply topically., Disp: , Rfl:     warfarin (COUMADIN) 2 MG tablet, 1 TABLET BY MOUTH EVERY SUNDAY, TUESDAY, THURSDAY, AND SATURDAY, Disp: 30 tablet, Rfl: 1    warfarin (COUMADIN) 4 MG tablet, TAKE 1 TABLET BY MOUTH MONDAY, WEDNESDAY, AND FRIDAY, Disp: 30 tablet, Rfl: 6    PMHx/PSHx Updates:  See patient's last visit with me on 10/31/2019.  See H&P on 2/12/2019        Pathology:  Cancer Staging  No matching staging information was found for the patient.          Objective:     Vitals:  afebrile    Physical Examination:   GEN: no apparent distress, comfortable; AAOx3  HEAD: atraumatic and normocephalic  EYES: no conjunctival pallor or muddiness, no icterus; normal pupil reaction to ambient light  ENT: OMM, no pharyngeal erythema, external bilateral ears WNL; no visible thrush or ulcers  NECK: no masses or swelling, trachea midline, no visible LAD/LN's   CV: no palpitations; no pedal edema; no noticeable JVD or neck vein distension;  chronic + murmur  CHEST: Normal respiratory effort; chest wall breath movements symmetrical; no audible wheezing  ABDOM: non-distended; no bloating  MUSC/Skeletal: arthropathy and uses cane to ambulate  EXTREM: no clubbing, cyanosis, inflammation; chronic swelling in LLE  SKIN: no rashes, lesions, ulcers, petechiae or subcutaneous nodules; chronic skin changes on feet  : no kowalski  NEURO: moving all 4 extremities; AAOx3; no tremors  PSYCH: normal mood, affect and behavior  LYMPH: no visible LN's or LAD               Labs:     3/3/2020 on chart from Dr aiken    10/28/2019  Lab Results   Component Value Date    WBC 8.3 10/28/2019    HGB 14.7 10/28/2019    HCT 43.9 10/28/2019    MCV 97.6 10/28/2019     10/28/2019     CMP  Sodium   Date Value Ref Range Status   10/28/2019 140 135 - 146 mmol/L Final     Potassium   Date Value Ref Range Status   10/28/2019 4.5 3.5 - 5.3 mmol/L Final     Chloride   Date Value Ref Range Status   10/28/2019 106 98 - 110 mmol/L Final     CO2   Date Value Ref Range Status   10/28/2019 26 20 - 32 mmol/L Final     Glucose   Date Value Ref Range Status   10/28/2019 88 65 - 139 mg/dL Final     Comment:               Non-fasting reference interval          BUN, Bld   Date Value Ref Range Status   10/28/2019 28 (H) 7 - 25 mg/dL Final     Creatinine   Date Value Ref Range Status   10/28/2019 1.51 (H) 0.70 - 1.11 mg/dL Final     Comment:     For patients >49 years of age, the reference limit  for Creatinine is approximately 13% higher for people  identified as -American.        11/19/2012 1.6 (H) 0.5 - 1.4 mg/dL Final     Calcium   Date Value Ref Range Status   10/28/2019 9.3 8.6 - 10.3 mg/dL Final   11/19/2012 8.5 (L) 8.7 - 10.5 mg/dL Final     Total Protein   Date Value Ref Range Status   10/28/2019 6.9 6.1 - 8.1 g/dL Final     Albumin   Date Value Ref Range Status   10/28/2019 4.0 3.6 - 5.1 g/dL Final     Total Bilirubin   Date Value Ref Range Status   10/28/2019 0.5 0.2 - 1.2  mg/dL Final     Alkaline Phosphatase   Date Value Ref Range Status   10/28/2019 51 40 - 115 U/L Final   11/19/2012 59 55 - 135 U/L Final     AST   Date Value Ref Range Status   10/28/2019 23 10 - 35 U/L Final   11/19/2012 27 10 - 40 U/L Final     ALT   Date Value Ref Range Status   10/28/2019 19 9 - 46 U/L Final     Anion Gap   Date Value Ref Range Status   10/12/2014 12 8 - 16 mmol/L Final   11/19/2012 10 5 - 15 meq/L Final     eGFR if    Date Value Ref Range Status   10/28/2019 49 (L) > OR = 60 mL/min/1.73m2 Final     eGFR if non    Date Value Ref Range Status   10/28/2019 42 (L) > OR = 60 mL/min/1.73m2 Final     Lab Results   Component Value Date    INR 1.4 (H) 03/16/2020    INR 1.9 (H) 03/03/2020    INR 1.9 (H) 02/17/2020         Radiology/Diagnostic Studies:    No results found.    I have reviewed all available lab results and radiology reports.    Assessment/Plan:   (1) 85 y.o. male with diagnosis of pulmonary emboli and DVT who has been referred by Dr Santacruz for evaluation by medical hematology/oncology.   -  He was found to have a LLE DVT in the distal superficial femoral and popliteal veins back in Nov 2017.  -  He also had a JONI and RLL pulmonary emboli at the same time   - he has been on coumadin since Nov 2017  - he has MTHFR-C homozygous positive  - we discussed the genetic implications of the MTHFR in children and siblings  - his homocysteine was elevated at 13.6  - factor V leiden was negative  - latest INR was 1.4 and he had been on coumadin 4mg po daily but for the past week he has been self-alternating 4mg and 2 mg po QOD.   - He has not had any repeat INR's done because he is afariad to go out with the current COVID19 epidemic.    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 Continue folbic  2. Continue management of the coumadin per patient's request and monitor the INR as instructed - will try to get him an at-home monitor  3. I recommend consideration for continual anticoagulation (possibly life-long)  4. F/u with cardiology       RTC in 3months  Fax note to  Randy Santacruz MD, [facility] Ram[facility]rez Lam      Discussion:       Total Time spent on patient:    I spent over 15 mins of time with the patient. Reviewed results of the recently ordered labs, tests, reports and studies; made directives with regards to the results. Over half of this time was spent couseling and coordinating care, making treatment and analytical decisions; ordering necessary labs, tests and studies; and discussing treatment options and setting up treatment plan(s) if indicated.      COVID-19 Discussion:    I had long discussion with patient and any applicable family about the COVID-19 coronavirus epidemic and the recommended precautions with regard to cancer and/or hematology patients. I have re-iterated the CDC recommendations for adequate hand washing, use of hand -like products, and coughing into elbow, etc. In addition, especially for our patients who are on chemotherapy and/or our otherwise immunocompromised patients, I have recommended avoidance of crowds, including movie theaters, restaurants, churches, etc. I have recommended avoidance  of any sick or symptomatic family members and/or friends. I have also recommended avoidance of any raw and unwashed food products, and general avoidance of food items that have not been prepared by themselves. The patient has been asked to call us immediately with any symptom developments, issues, questions or other general concerns.     Telemedicine Statement:    The patient acknowledged and agreed to the audio/video encounter and the patient who is being provided medical services by telemedicine is:  (1) informed of the relationship between the physician and patient and the respective role of any other health care provider with respect to management of the patient; and (2) notified that he or she may decline to receive medical services by telemedicine and may withdraw from such care at any time.        I have explained all of the above in detail and the patient understands all of the current recommendation(s). I have answered all of their questions to the best of my ability and to their complete satisfaction.   The patient is to continue with the current management plan.            Electronically signed by Doe Hernández MD        Answers for HPI/ROS submitted by the patient on 4/6/2020   appetite change : No  unexpected weight change: No  visual disturbance: No  cough: No  shortness of breath: No  chest pain: No  abdominal pain: No  diarrhea: No  frequency: No  back pain: No  rash: No  headaches: No  adenopathy: No  nervous/ anxious: No

## 2020-04-06 ENCOUNTER — OFFICE VISIT (OUTPATIENT)
Dept: HEMATOLOGY/ONCOLOGY | Facility: CLINIC | Age: 85
End: 2020-04-06
Payer: MEDICARE

## 2020-04-06 DIAGNOSIS — Z79.01 LONG TERM (CURRENT) USE OF ANTICOAGULANTS: ICD-10-CM

## 2020-04-06 DIAGNOSIS — Z86.711 HISTORY OF PULMONARY EMBOLUS (PE): Primary | ICD-10-CM

## 2020-04-06 DIAGNOSIS — Z86.718 HISTORY OF DVT (DEEP VEIN THROMBOSIS): ICD-10-CM

## 2020-04-06 DIAGNOSIS — I82.402 ACUTE DEEP VEIN THROMBOSIS (DVT) OF LEFT LOWER EXTREMITY, UNSPECIFIED VEIN: ICD-10-CM

## 2020-04-06 PROCEDURE — 1101F PR PT FALLS ASSESS DOC 0-1 FALLS W/OUT INJ PAST YR: ICD-10-PCS | Mod: 95,,, | Performed by: INTERNAL MEDICINE

## 2020-04-06 PROCEDURE — 1159F PR MEDICATION LIST DOCUMENTED IN MEDICAL RECORD: ICD-10-PCS | Mod: 95,,, | Performed by: INTERNAL MEDICINE

## 2020-04-06 PROCEDURE — 99213 OFFICE O/P EST LOW 20 MIN: CPT | Mod: 95,,, | Performed by: INTERNAL MEDICINE

## 2020-04-06 PROCEDURE — 1159F MED LIST DOCD IN RCRD: CPT | Mod: 95,,, | Performed by: INTERNAL MEDICINE

## 2020-04-06 PROCEDURE — 1101F PT FALLS ASSESS-DOCD LE1/YR: CPT | Mod: 95,,, | Performed by: INTERNAL MEDICINE

## 2020-04-06 PROCEDURE — 99213 PR OFFICE/OUTPT VISIT, EST, LEVL III, 20-29 MIN: ICD-10-PCS | Mod: 95,,, | Performed by: INTERNAL MEDICINE

## 2020-05-07 ENCOUNTER — TELEPHONE (OUTPATIENT)
Dept: HEMATOLOGY/ONCOLOGY | Facility: CLINIC | Age: 85
End: 2020-05-07

## 2020-05-07 NOTE — TELEPHONE ENCOUNTER
Called the patient and instructed him that his INR is 2.1.  The patient stated that he is on alternating days of Coumadin 4 mg/2 mg.  I instructed him to continue with the same dose and re-check in 1 week.

## 2020-05-07 NOTE — TELEPHONE ENCOUNTER
----- Message from Doe Hernández MD sent at 5/7/2020  7:42 AM CDT -----  INR is adequate - do we manage this ?

## 2020-05-19 ENCOUNTER — TELEPHONE (OUTPATIENT)
Dept: HEMATOLOGY/ONCOLOGY | Facility: CLINIC | Age: 85
End: 2020-05-19

## 2020-05-19 NOTE — TELEPHONE ENCOUNTER
Called the patient and instructed him that his INR is 2.0.  Patient is presently taken Coumadin 4 mg.  Instructed the patient to continue with the 4 mg and re-check his INR in 1 month.

## 2020-06-04 ENCOUNTER — HOSPITAL ENCOUNTER (OUTPATIENT)
Dept: RADIOLOGY | Facility: HOSPITAL | Age: 85
Discharge: HOME | End: 2020-06-04
Attending: INTERNAL MEDICINE
Payer: MEDICARE

## 2020-06-04 DIAGNOSIS — R06.09 DOE (DYSPNEA ON EXERTION): ICD-10-CM

## 2020-06-04 DIAGNOSIS — Z86.711 HISTORY OF PULMONARY EMBOLUS (PE): ICD-10-CM

## 2020-06-04 PROCEDURE — 71046 X-RAY EXAM CHEST 2 VIEWS: CPT | Mod: TC,FY

## 2020-06-04 PROCEDURE — 71046 X-RAY EXAM CHEST 2 VIEWS: CPT | Mod: 26,,, | Performed by: RADIOLOGY

## 2020-06-04 PROCEDURE — 71046 XR CHEST PA AND LATERAL: ICD-10-PCS | Mod: 26,,, | Performed by: RADIOLOGY

## 2020-06-18 ENCOUNTER — TELEPHONE (OUTPATIENT)
Dept: HEMATOLOGY/ONCOLOGY | Facility: CLINIC | Age: 85
End: 2020-06-18

## 2020-06-18 NOTE — TELEPHONE ENCOUNTER
----- Message from Doe Hernández MD sent at 6/17/2020 11:54 AM CDT -----  Due we manage his coumadin?

## 2020-06-18 NOTE — TELEPHONE ENCOUNTER
Called the patient and instructed him that his INR is 1.7.  I instructed him to increase his Coumadin to 4.5 mg and re-check in 1 week.  I asked the patient to see if he has 1 mg tablets and he said no.  I called into [facility] Cou[facility]in 1 mg - take 0.5 mg daily #30 with 3 refills.

## 2020-06-25 ENCOUNTER — OFFICE VISIT (OUTPATIENT)
Dept: CARDIOLOGY | Facility: CLINIC | Age: 85
End: 2020-06-25
Payer: MEDICARE

## 2020-06-25 VITALS
DIASTOLIC BLOOD PRESSURE: 60 MMHG | OXYGEN SATURATION: 94 % | WEIGHT: 228.38 LBS | HEIGHT: 68 IN | HEART RATE: 70 BPM | BODY MASS INDEX: 34.61 KG/M2 | SYSTOLIC BLOOD PRESSURE: 124 MMHG

## 2020-06-25 DIAGNOSIS — I35.0 NONRHEUMATIC AORTIC VALVE STENOSIS: ICD-10-CM

## 2020-06-25 DIAGNOSIS — R06.09 DOE (DYSPNEA ON EXERTION): Primary | ICD-10-CM

## 2020-06-25 DIAGNOSIS — N18.30 STAGE 3 CHRONIC KIDNEY DISEASE: ICD-10-CM

## 2020-06-25 DIAGNOSIS — Z00.00 ANNUAL PHYSICAL EXAM: ICD-10-CM

## 2020-06-25 DIAGNOSIS — R79.89 ELEVATED BRAIN NATRIURETIC PEPTIDE (BNP) LEVEL: ICD-10-CM

## 2020-06-25 DIAGNOSIS — Z79.01 LONG TERM (CURRENT) USE OF ANTICOAGULANTS: ICD-10-CM

## 2020-06-25 DIAGNOSIS — N52.8 OTHER MALE ERECTILE DYSFUNCTION: ICD-10-CM

## 2020-06-25 DIAGNOSIS — I10 ESSENTIAL HYPERTENSION: ICD-10-CM

## 2020-06-25 DIAGNOSIS — Z00.00 ANNUAL PHYSICAL EXAM: Primary | ICD-10-CM

## 2020-06-25 DIAGNOSIS — Z15.89 HOMOZYGOUS MTHFR MUTATION C677T: ICD-10-CM

## 2020-06-25 DIAGNOSIS — I82.402 ACUTE DEEP VEIN THROMBOSIS (DVT) OF LEFT LOWER EXTREMITY, UNSPECIFIED VEIN: ICD-10-CM

## 2020-06-25 DIAGNOSIS — E65 ABDOMINAL OBESITY: ICD-10-CM

## 2020-06-25 PROCEDURE — 3074F SYST BP LT 130 MM HG: CPT | Mod: CPTII,S$GLB,, | Performed by: INTERNAL MEDICINE

## 2020-06-25 PROCEDURE — 99999 PR PBB SHADOW E&M-EST. PATIENT-LVL V: ICD-10-PCS | Mod: PBBFAC,,, | Performed by: INTERNAL MEDICINE

## 2020-06-25 PROCEDURE — 1126F PR PAIN SEVERITY QUANTIFIED, NO PAIN PRESENT: ICD-10-PCS | Mod: S$GLB,,, | Performed by: INTERNAL MEDICINE

## 2020-06-25 PROCEDURE — 1101F PT FALLS ASSESS-DOCD LE1/YR: CPT | Mod: CPTII,S$GLB,, | Performed by: INTERNAL MEDICINE

## 2020-06-25 PROCEDURE — 99215 PR OFFICE/OUTPT VISIT, EST, LEVL V, 40-54 MIN: ICD-10-PCS | Mod: 25,S$GLB,, | Performed by: INTERNAL MEDICINE

## 2020-06-25 PROCEDURE — 1159F MED LIST DOCD IN RCRD: CPT | Mod: S$GLB,,, | Performed by: INTERNAL MEDICINE

## 2020-06-25 PROCEDURE — 93000 EKG 12-LEAD: ICD-10-PCS | Mod: S$GLB,,, | Performed by: INTERNAL MEDICINE

## 2020-06-25 PROCEDURE — 93000 ELECTROCARDIOGRAM COMPLETE: CPT | Mod: S$GLB,,, | Performed by: INTERNAL MEDICINE

## 2020-06-25 PROCEDURE — 3078F PR MOST RECENT DIASTOLIC BLOOD PRESSURE < 80 MM HG: ICD-10-PCS | Mod: CPTII,S$GLB,, | Performed by: INTERNAL MEDICINE

## 2020-06-25 PROCEDURE — 3078F DIAST BP <80 MM HG: CPT | Mod: CPTII,S$GLB,, | Performed by: INTERNAL MEDICINE

## 2020-06-25 PROCEDURE — 99215 OFFICE O/P EST HI 40 MIN: CPT | Mod: 25,S$GLB,, | Performed by: INTERNAL MEDICINE

## 2020-06-25 PROCEDURE — 1159F PR MEDICATION LIST DOCUMENTED IN MEDICAL RECORD: ICD-10-PCS | Mod: S$GLB,,, | Performed by: INTERNAL MEDICINE

## 2020-06-25 PROCEDURE — 1101F PR PT FALLS ASSESS DOC 0-1 FALLS W/OUT INJ PAST YR: ICD-10-PCS | Mod: CPTII,S$GLB,, | Performed by: INTERNAL MEDICINE

## 2020-06-25 PROCEDURE — 99999 PR PBB SHADOW E&M-EST. PATIENT-LVL V: CPT | Mod: PBBFAC,,, | Performed by: INTERNAL MEDICINE

## 2020-06-25 PROCEDURE — 1126F AMNT PAIN NOTED NONE PRSNT: CPT | Mod: S$GLB,,, | Performed by: INTERNAL MEDICINE

## 2020-06-25 PROCEDURE — 3074F PR MOST RECENT SYSTOLIC BLOOD PRESSURE < 130 MM HG: ICD-10-PCS | Mod: CPTII,S$GLB,, | Performed by: INTERNAL MEDICINE

## 2020-06-25 RX ORDER — TAMSULOSIN HYDROCHLORIDE 0.4 MG/1
1 CAPSULE ORAL DAILY
COMMUNITY
Start: 2020-06-10

## 2020-06-25 RX ORDER — SILDENAFIL 50 MG/1
50 TABLET, FILM COATED ORAL DAILY PRN
Qty: 10 TABLET | Refills: 10
Start: 2020-06-25 | End: 2021-01-01

## 2020-06-25 RX ORDER — WARFARIN 1 MG/1
TABLET ORAL
COMMUNITY
Start: 2020-06-18 | End: 2020-01-01

## 2020-06-25 NOTE — PROGRESS NOTES
Subjective:    Patient ID:  Rafa Tompkins is a 85 y.o. male who presents for evaluation of Follow-up  For aortic valve stenosis and left leg peripheral edema post left leg DVT 11/2017  Hematologist and referred by Dr. Hernández  PCP: Dr. Santacruz, does lab including lipids  Prior cardiologist: Dr. Olsen, last seen 12/2017 and advised 4 months follow up for cardiac catheterization  Renal: Dr. Meadows  Lives with wife, Basilia, non-smoker  Retired     Health literacy: high  Vaccinations: up-to-date, ?Shingle   Activities: exercise once a week for 40 minutes, no problem, cut down due to quarantine  Nicotine: pipe smoker occasionally and last time in 12/2019  Alcohol: about 2 drinks (beer or wine) daily  Illicit drugs: none  Cardiac symptoms: JEFF with taking the garbage out, up a slope  Home BP: 125/65  Medication compliance: yes  Diet: regular  Caffeine: up to 3 cpd  Labs: 2/2019, no LDL, on Rx, CMP (Cr. 1.4, eGFR 47), normal CBC  No results found for: TSH   No results found for: LABA1C, HGBA1C    Lab Results   Component Value Date    WBC 8.3 10/28/2019    HGB 14.7 10/28/2019    HCT 43.9 10/28/2019    MCV 97.6 10/28/2019     10/28/2019       CMP  Sodium   Date Value Ref Range Status   10/28/2019 140 135 - 146 mmol/L Final     Potassium   Date Value Ref Range Status   10/28/2019 4.5 3.5 - 5.3 mmol/L Final     Chloride   Date Value Ref Range Status   10/28/2019 106 98 - 110 mmol/L Final     CO2   Date Value Ref Range Status   10/28/2019 26 20 - 32 mmol/L Final     Glucose   Date Value Ref Range Status   10/28/2019 88 65 - 139 mg/dL Final     Comment:               Non-fasting reference interval          BUN, Bld   Date Value Ref Range Status   10/28/2019 28 (H) 7 - 25 mg/dL Final     Creatinine   Date Value Ref Range Status   10/28/2019 1.51 (H) 0.70 - 1.11 mg/dL Final     Comment:     For patients >49 years of age, the reference limit  for Creatinine is approximately 13% higher for  "people  identified as -American.        11/19/2012 1.6 (H) 0.5 - 1.4 mg/dL Final     Calcium   Date Value Ref Range Status   10/28/2019 9.3 8.6 - 10.3 mg/dL Final   11/19/2012 8.5 (L) 8.7 - 10.5 mg/dL Final     Total Protein   Date Value Ref Range Status   10/28/2019 6.9 6.1 - 8.1 g/dL Final     Albumin   Date Value Ref Range Status   10/28/2019 4.0 3.6 - 5.1 g/dL Final     Total Bilirubin   Date Value Ref Range Status   10/28/2019 0.5 0.2 - 1.2 mg/dL Final     Alkaline Phosphatase   Date Value Ref Range Status   10/28/2019 51 40 - 115 U/L Final   11/19/2012 59 55 - 135 U/L Final     AST   Date Value Ref Range Status   10/28/2019 23 10 - 35 U/L Final   11/19/2012 27 10 - 40 U/L Final     ALT   Date Value Ref Range Status   10/28/2019 19 9 - 46 U/L Final     Anion Gap   Date Value Ref Range Status   10/12/2014 12 8 - 16 mmol/L Final   11/19/2012 10 5 - 15 meq/L Final     eGFR if    Date Value Ref Range Status   10/28/2019 49 (L) > OR = 60 mL/min/1.73m2 Final     eGFR if non    Date Value Ref Range Status   10/28/2019 42 (L) > OR = 60 mL/min/1.73m2 Final     @labrcntip(troponini)@    BNP   Date Value Ref Range Status   04/07/2012 358 (H) 0 - 99 pg/mL Final     Comment:     Values of less than 100 pg/ml are consistent with non-CHF populations.   } No results found for: CHOL  No results found for: HDL  No results found for: LDLCALC  No results found for: TRIG  No results found for: CHOLHDL     Last Echo: 7/2019  Last stress test: 7/2019, Lexiscan  Cardiovascular angiogram: none  ECG: NSR, rate 70, RBBB  Fundoscopic exam: within the past year, negative for HTN changes    In 6/2019:  WM here to re-establish cardiac care, did not return for [facility] to evaluate AS after last visit with Dr. Olsen. Dr. Calabresi concern about unilateral edema post DVT ("recommended referral to cardiology for the pedal swelling etc").  No heart worries, and denies any cardiac symptoms.     Dr. Olsen noted " ""82 y.o. male with a past medical history of DVT, pulmonary embolism      This gentleman was recently admitted to the hospital after a diagnosis of DVT and pulmonary embolism.  He underwent extensive evaluation and was discharged on anticoagulation.  During this hospital stay at [facility] he also had an echocardiography which showed severe aortic stenosis based on mean gradient of 44, aortic valve area of 0.8 cm².  He usually is not short of breath unless he tries to exert himself. Denies syncope, presyncope.    Currently the deep vein thrombosis and the pulmonary embolism is fresh hence I do not want to do any interruption of anticoagulation. I will see him back in few months time and then schedule him for a left and right heart catheterization for assessment of the aortic stenosis.:    Follow-up  Associated symptoms include numbness (hands). Pertinent negatives include no chest pain, coughing, diaphoresis, fever, headaches, joint swelling, myalgias, nausea, vertigo or weakness.     HPI comments: in 6/2020, here for annual review. Like to go to Cardiac Rehab, report some JEFF. No other heart worries.  Echo 7/2019 - Concentric left ventricular remodeling.  Mild left atrial enlargement.  Normal left ventricular systolic function. The estimated ejection fraction is 61%  Normal LV diastolic function.  No wall motion abnormalities.  The aortic valve is trileaflet but malformed.  Normal right ventricular systolic function.  Mild right atrial enlargement.  Severe aortic valve stenosis.  Aortic valve area is 0.84 cm2; peak velocity is 4.46 m/s; mean gradient is 49 mmHg.  Normal central venous pressure (3 mm Hg).  The estimated PA systolic pressure is 32 mm Hg    Lexiscan - 1.  Scintigraphically negative for ischemia or infarct.  2. the global left ventricular systolic function is within normal limits with an LV ejection fraction of 56 % and no evidence of LV dilatation. Wall motion is normal.     Review of " Systems   Constitution: Negative for diaphoresis, fever, malaise/fatigue, night sweats and weight gain.        Steady weight from 6/2019   HENT: Negative for nosebleeds and tinnitus.    Eyes: Negative for visual disturbance.   Cardiovascular: Positive for dyspnea on exertion (with lifting, last 2-3 weeks) and leg swelling. Negative for chest pain, claudication, cyanosis, irregular heartbeat, near-syncope, orthopnea, palpitations and paroxysmal nocturnal dyspnea.   Respiratory: Negative for cough, shortness of breath, sleep disturbances due to breathing, snoring and wheezing.         [place] score 4 today a 7, never tested for FAROOQ   Endocrine: Negative for polydipsia and polyuria.   Hematologic/Lymphatic: Does not bruise/bleed easily.   Skin: Positive for skin cancer. Negative for color change, flushing, nail changes, poor wound healing and suspicious lesions.   Musculoskeletal: Positive for arthritis and muscle cramps. Negative for falls, gout, joint pain, joint swelling, muscle weakness and myalgias.   Gastrointestinal: Positive for constipation. Negative for heartburn, hematemesis, hematochezia, melena and nausea.   Genitourinary: Positive for decreased libido, incomplete emptying, nocturia and urgency.   Neurological: Positive for numbness (hands). Negative for disturbances in coordination, excessive daytime sleepiness, dizziness, focal weakness, headaches, light-headedness, loss of balance, vertigo and weakness.   Psychiatric/Behavioral: Negative for depression and substance abuse. The patient does not have insomnia and is not nervous/anxious.         Objective:    Physical Exam   Constitutional: He is oriented to person, place, and time. He appears well-developed and well-nourished.   Use a cane   HENT:   Head: Normocephalic.   Eyes: Pupils are equal, round, and reactive to light. Conjunctivae and EOM are normal.   Neck: Normal range of motion. Neck supple. No JVD present. No thyromegaly present.  "  Circumference 17.5"   Cardiovascular: Normal rate, regular rhythm and intact distal pulses. Exam reveals no gallop and no friction rub.   Murmur heard.   Harsh midsystolic murmur is present with a grade of 2/6 at the upper right sternal border radiating to the neck. Preserved P2  Pulses:       Carotid pulses are 2+ on the right side and 2+ on the left side.       Radial pulses are 2+ on the right side and 2+ on the left side.        Femoral pulses are 2+ on the right side and 2+ on the left side.       Popliteal pulses are 2+ on the right side and 2+ on the left side.        Dorsalis pedis pulses are 2+ on the right side and 1+ on the left side.        Posterior tibial pulses are 2+ on the right side and 1+ on the left side.   Pulmonary/Chest: Effort normal and breath sounds normal. He has no rales. He exhibits no tenderness.   Diminished breath sounds and prolong expiration.   Abdominal: Soft. Bowel sounds are normal. There is no abdominal tenderness.   Waist 48" up to 49", hip 42"   Musculoskeletal: Normal range of motion.         General: No edema.   Lymphadenopathy:     He has no cervical adenopathy.   Neurological: He is alert and oriented to person, place, and time.   Skin: Skin is warm and dry. Rash (bilateral stasis dermatitis) noted.         Assessment:       1. JEFF (dyspnea on exertion), started 3/2019    2. Annual physical exam    3. Essential hypertension, Dx in the 1970s    4. Homozygous MTHFR mutation C677T    5. Long term (current) use of anticoagulants [Z79.01]    6. Nonrheumatic aortic valve stenosis    7. Abdominal obesity    8. Acute deep vein thrombosis (DVT) of left lower extremity, unspecified vein    9. Stage 3 chronic kidney disease    10. Elevated brain natriuretic peptide (BNP) level    11. Other male erectile dysfunction         Plan:       Rafa was seen today for follow-up.    Diagnoses and all orders for this visit:    JEFF (dyspnea on exertion), started 3/2019    Annual physical " exam  -     EKG 12-lead    Essential hypertension, Dx in the 1970s    Homozygous MTHFR mutation C677T    Long term (current) use of anticoagulants [Z79.01]    Nonrheumatic aortic valve stenosis  -     Ambulatory referral/consult to Interventional Cardiology; Future    Abdominal obesity    Acute deep vein thrombosis (DVT) of left lower extremity, unspecified vein    Stage 3 chronic kidney disease    Elevated brain natriuretic peptide (BNP) level    Other male erectile dysfunction  -     sildenafiL (VIAGRA) 50 MG tablet; Take 1 tablet (50 mg total) by mouth daily as needed for Erectile Dysfunction.    - All medical issues reviewed, continue current Rx.  - CV status stable, all medications reviewed, patient acknowledge good understanding.  - Recommend healthy living: no nicotine, moderate alcohol, healthy diet and regular exercise aiming for fitness, and weight control  - .Discussed healthy daily limit of 1 oz of pure alcohol in any 24 hours (roughly 2 12-oz beers, 8 oz of wine (8%-12% alcohol), or 2.5 oz of liquor (80 proof)), can not save up.   - Instruction for Mediterranean diet and heart healthy exercise given.  - Check home blood pressure, 2 days weekly, do 2 readings within 5 minutes in AM and PM, keep log for review.  - Weigh twice weekly, try to lose 1-2 lbs per week. Target weight loss of 5%-10%.  - Have to do some abdominal exercise to rid belly fat  - Highly recommend 30 minutes of exercise / activities daily, can have Sunday off, with 2-3 sessions of muscle strengthening weekly. A  would be very helpful.  - Recommend at least biannual cardiovascular evaluation in view of patient's significant risk factors. Patient's preference.        Greater than 50% was spent in counseling and coordination of care. The above assessment and plan have been discussed at length. Referring physician's note reviewed. Labs and procedure over the last 6 months reviewed. Problem List updated. Asked to bring in all  active medications / pills bottles with next visit.

## 2020-06-25 NOTE — PROGRESS NOTES
" Patient ID:  Rafa Tompkins is a 85 y.o. male who presents for {Misc; evaluation/follow-up:58038::"follow-up"} of Follow-up      Health literacy: {DESC; LOW/MEDIUM/HIGH:88278} High  Activities: slowed down since Covid  Nicotine: occasional pipe smoker  Alcohol: 2 drinks daily  Illicit drugs: none  Cardiac symptoms: none  Home BP: do not check  Medication compliance: yes  Diet: regular, diabetic, Mediterranean regular  Caffeine: 3 cpd  Labs:   Last Echo: 2019  Last stress test: 2019  Cardiovascular angiogram:   EC2019   Fundoscopic exam:     No flowsheet data found.      HPI    ROS     Objective:    Physical Exam      Assessment:       1. Annual physical exam         Plan:         Annual physical exam  -     EKG 12-lead                  "

## 2020-06-25 NOTE — PATIENT INSTRUCTIONS
Discharge Instructions for Aortic Valve Stenosis  You have been diagnosed with aortic valve stenosis. This means the aortic valve in your heart is stiff or remains in a near-closed position and has trouble opening. Because of this, your heart has to work harder to push the blood through the valve. In some cases, this extra work makes the muscle of the heart thicken. The extra work can tire the heart and cause its muscle to weaken over time. This condition often changes very slowly and doesn't need to be treated. But in some people, it may get worse faster and need to be treated. Some people can control their stenosis with medicines. In some severe cases, surgery is needed.  Home care  · Check with your doctor before taking any over-the-counter medicines, herbal products, or vitamins.  · Take your medicines exactly as directed. Dont skip doses.  · Keep all follow-up appointments. Some people with aortic stenosis dont have symptoms. Others need close follow-up and surgery.  Lifestyle changes  · Maintain a healthy weight. Get help to lose any extra pounds.  · Ask your doctor if an exercise program is right for you. Some people with aortic stenosis need to be very careful about exercise as it can result in fainting.  · Break the smoking habit. Enroll in a stop-smoking program to improve your chances of success.  Follow-up care  Make a follow-up appointment with your healthcare provider, or as directed.  When to call 911  Call 911 or go to the emergency room right away if you have any of the following:  · Chest pain or shortness of breath  · Weakness in the muscles of your face, arms, or legs  · Trouble speaking  · Rapid pulse or pounding heartbeat  · Fainting or dizziness  · Sudden vertigo   Date Last Reviewed: 6/1/2016  © 7249-3523 [employer]. [address] [place] [address], [place] [address]. All rights reserved. This information is not intended as a substitute for professional medical care. Always follow  your healthcare professional's instructions.        Understanding Transcatheter Aortic Valve Replacement (TAVR)  Transcatheter aortic valve replacement (TAVR) is a procedure to replace a diseased aortic valve with a new tissue or biologic valve. The old heart valve is not removed but acts like an anchor for the new heart valve. This procedure is done through small incisions using a long thin tube (catheter), X-rays, and ultrasound.  The aortic valve controls blood flow from the heart to the body. In some people, the valve becomes scarred and stiff and has trouble opening. This is a condition called aortic stenosis. The heart then has to work harder to push blood through the narrowed heart valve to the rest of the body. Over time, the extra work can cause the heart muscle to weaken. This may lead to symptoms such as tiredness, shortness of breath, chest pain, and fainting. It can lead to heart failure.  In TAVR, the catheter is usually placed in the femoral artery in your groin. Sometimes, the catheter is placed through a small incision in your chest underneath the collarbone or an incision between the ribs. The doctor uses the catheter to bring the new valve to your heart. The new valve is made of cow or pig heart tissue and is mounted on a metal frame. The new valve helps improve blood flow from the heart to the rest of the body.  Reasons for TAVR  TAVR is an option if you are at higher risk traditional open-heart surgery to replace a valve. TAVR may be an option if you have:  · Advanced age  · A weaker heart  · Previous heart surgery  · A history of stroke  · Chronic obstructive lung disease  · Coronary artery disease  · Kidney disease  · Diabetes  · Previous radiation treatment to the chest  · Porcelain aorta, extensive calcium around the aorta that would make open heart surgery impossible or difficult  · Frailty or physical disability that would make recovery from an open heart surgery challenging  TAVR may also  be a choice for you for other reasons. A team of doctors makes the decision to do this surgery on a case-by-case basis.  Risks and possible complications  TAVR is very effective in most people. However, all medical procedures carry some risks and possible complications. Some common risks of TAVR include:  · Bleeding or the need for a transfusion  · Anemia (not enough red blood cells in the blood)  · Blood clots  · Infection  · Collection of fluid around your heart  · Confusion or memory problems  · Damage to the heart  · Damage to your blood vessels  · Failure of the new valve  · Heart attack  · Heart rhythm problems that may require a pacemaker  · Kidney damage or failure  · Lung puncture  · Stroke  · Risks of anesthesia (including death)  · Rarely, the new heart valve can move out of position after it has been implanted  · Leaking of blood in or around the new heart valve  You may have other risks, depending on your medical condition. Be sure to talk with your doctor if you have any concerns before the procedure.  Life after a heart valve replacement  · A new heart valve can help ease symptoms you may have had. These include pain or pressure in your chest, shortness of breath, and tiredness.  · After the surgery, you will need to take aspirin or other blood-thinning medicine every day. You will likely also need to take an anti-platelet medication such as clopidogrel  for a certain period of time. These medicines help prevent blood clots in your new valve.  · You will need to take antibiotics before you have dental work, as prescribed by your healthcare provider. This is to help prevent bacteria from harming your new heart valve.  · Your healthcare provider may tell you to make some lifestyle changes to protect your heart and make it stronger. These include exercise, quitting smoking, and keeping a healthy weight.  · After your recovery from TAVR, you may be able to return to regular activities with a noticeable  improvement in the symptoms from your heart valve disease. Be sure to talk to your doctor before starting any exercise program.  Date Last Reviewed: 5/1/2016  © 6265-3217 [employer]. [address] [place] [address], [place] [address]. All rights reserved. This information is not intended as a substitute for professional medical care. Always follow your healthcare professional's instructions.

## 2020-06-30 ENCOUNTER — OFFICE VISIT (OUTPATIENT)
Dept: DERMATOLOGY | Facility: CLINIC | Age: 85
End: 2020-06-30
Payer: MEDICARE

## 2020-06-30 VITALS — HEIGHT: 68 IN | WEIGHT: 228 LBS | BODY MASS INDEX: 34.56 KG/M2

## 2020-06-30 DIAGNOSIS — L72.0 EPIDERMAL CYST: ICD-10-CM

## 2020-06-30 DIAGNOSIS — D49.9 NEOPLASM: Primary | ICD-10-CM

## 2020-06-30 DIAGNOSIS — Z85.820 HISTORY OF MELANOMA: ICD-10-CM

## 2020-06-30 PROCEDURE — 1126F AMNT PAIN NOTED NONE PRSNT: CPT | Mod: S$GLB,,, | Performed by: DERMATOLOGY

## 2020-06-30 PROCEDURE — 1101F PT FALLS ASSESS-DOCD LE1/YR: CPT | Mod: CPTII,S$GLB,, | Performed by: DERMATOLOGY

## 2020-06-30 PROCEDURE — 99999 PR PBB SHADOW E&M-EST. PATIENT-LVL III: ICD-10-PCS | Mod: PBBFAC,,, | Performed by: DERMATOLOGY

## 2020-06-30 PROCEDURE — 88305 TISSUE EXAM BY PATHOLOGIST: ICD-10-PCS | Mod: 26,,, | Performed by: DERMATOLOGY

## 2020-06-30 PROCEDURE — 1126F PR PAIN SEVERITY QUANTIFIED, NO PAIN PRESENT: ICD-10-PCS | Mod: S$GLB,,, | Performed by: DERMATOLOGY

## 2020-06-30 PROCEDURE — 11102 PR TANGENTIAL BIOPSY, SKIN, SINGLE LESION: ICD-10-PCS | Mod: S$GLB,,, | Performed by: DERMATOLOGY

## 2020-06-30 PROCEDURE — 99999 PR PBB SHADOW E&M-EST. PATIENT-LVL III: CPT | Mod: PBBFAC,,, | Performed by: DERMATOLOGY

## 2020-06-30 PROCEDURE — 1159F PR MEDICATION LIST DOCUMENTED IN MEDICAL RECORD: ICD-10-PCS | Mod: S$GLB,,, | Performed by: DERMATOLOGY

## 2020-06-30 PROCEDURE — 1101F PR PT FALLS ASSESS DOC 0-1 FALLS W/OUT INJ PAST YR: ICD-10-PCS | Mod: CPTII,S$GLB,, | Performed by: DERMATOLOGY

## 2020-06-30 PROCEDURE — 88305 TISSUE EXAM BY PATHOLOGIST: CPT | Mod: 26,,, | Performed by: DERMATOLOGY

## 2020-06-30 PROCEDURE — 99212 OFFICE O/P EST SF 10 MIN: CPT | Mod: 25,S$GLB,, | Performed by: DERMATOLOGY

## 2020-06-30 PROCEDURE — 88305 TISSUE EXAM BY PATHOLOGIST: CPT | Performed by: DERMATOLOGY

## 2020-06-30 PROCEDURE — 99212 PR OFFICE/OUTPT VISIT, EST, LEVL II, 10-19 MIN: ICD-10-PCS | Mod: 25,S$GLB,, | Performed by: DERMATOLOGY

## 2020-06-30 PROCEDURE — 11102 TANGNTL BX SKIN SINGLE LES: CPT | Mod: S$GLB,,, | Performed by: DERMATOLOGY

## 2020-06-30 PROCEDURE — 1159F MED LIST DOCD IN RCRD: CPT | Mod: S$GLB,,, | Performed by: DERMATOLOGY

## 2020-06-30 NOTE — PATIENT INSTRUCTIONS

## 2020-06-30 NOTE — PROGRESS NOTES
Subjective:       Patient ID:  Rafa Tompkins is a 85 y.o. male who presents for   Chief Complaint   Patient presents with    Spot     R lower leg     Also co many bumps over many years on neck and scalp.    No treatment.      New patient  Last ov 8/16/2019 with Dr Corwin Cross of skin cancer    Patient presents today with spot on the R lower leg, x 1 month, raised and scaly, using antibiotic cream    Review of Systems   Constitutional: Negative for fever, chills and fatigue.   HENT: Negative for congestion and sore throat.    Respiratory: Negative for cough and shortness of breath.    Skin: Negative for itching, rash and dry skin.   Hematologic/Lymphatic: Bruises/bleeds easily.        Objective:    Physical Exam   Constitutional: He appears well-developed and well-nourished. No distress.   Eyes: No conjunctival no injection.   Cardiovascular: There is no local extremity swelling and no dependent edema.     Neurological: He is alert and oriented to person, place, and time. He is not disoriented.   Psychiatric: He has a normal mood and affect.   Skin:   Areas Examined (abnormalities noted in diagram):   Scalp / Hair Palpated and Inspected  Neck Inspection Performed  RLE Inspected  LLE Inspection Performed                   Diagram Legend     Erythematous scaling macule/papule c/w actinic keratosis       Vascular papule c/w angioma      Pigmented verrucoid papule/plaque c/w seborrheic keratosis      Yellow umbilicated papule c/w sebaceous hyperplasia      Irregularly shaped tan macule c/w lentigo     1-2 mm smooth white papules consistent with Milia      Movable subcutaneous cyst with punctum c/w epidermal inclusion cyst      Subcutaneous movable cyst c/w pilar cyst      Firm pink to brown papule c/w dermatofibroma      Pedunculated fleshy papule(s) c/w skin tag(s)      Evenly pigmented macule c/w junctional nevus     Mildly variegated pigmented, slightly irregular-bordered macule c/w mildly atypical nevus       Flesh colored to evenly pigmented papule c/w intradermal nevus       Pink pearly papule/plaque c/w basal cell carcinoma      Erythematous hyperkeratotic cursted plaque c/w SCC      Surgical scar with no sign of skin cancer recurrence      Open and closed comedones      Inflammatory papules and pustules      Verrucoid papule consistent consistent with wart     Erythematous eczematous patches and plaques     Dystrophic onycholytic nail with subungual debris c/w onychomycosis     Umbilicated papule    Erythematous-base heme-crusted tan verrucoid plaque consistent with inflamed seborrheic keratosis     Erythematous Silvery Scaling Plaque c/w Psoriasis     See annotation          Assessment / Plan:      Pathology Orders:     Normal Orders This Visit    Specimen to Pathology, Dermatology     Questions:    Procedure Type: Dermatology and skin neoplasms    Number of Specimens: 1    ------------------------: -------------------------    Spec 1 Procedure: Biopsy    Spec 1 Clinical Impression: ro scc    Spec 1 Source: r [email or web address]        Neoplasm  -     Specimen to Pathology, Dermatology  Shave biopsy procedure note:    Shave biopsy performed after verbal consent including risk of infection, scar, recurrence, need for additional treatment of site. Area prepped with alcohol, anesthetized with approximately 1.0cc of 1% lidocaine with epinephrine. Lesional tissue shaved with razor blade. Hemostasis achieved with application of aluminum chloride followed by hyfrecation. No complications. Dressing applied. Wound care explained.    Plan edc if +.    Epidermal cyst  Multiple.  Discussed with patient the benign nature of these lesions and that no treatment is indicated.      History of melanoma  Recommended a full body skin check for moles and skin cancer screening to be done later.             Follow up if symptoms worsen or fail to improve, for Post biopsy results.

## 2020-07-06 LAB
FINAL PATHOLOGIC DIAGNOSIS: NORMAL
GROSS: NORMAL
MICROSCOPIC EXAM: NORMAL

## 2020-07-08 NOTE — TELEPHONE ENCOUNTER
----- Message from [place] Nava sent at 7/6/2020  9:39 AM CDT -----  The patient would like a call back with the results of his PT INR.

## 2020-07-08 NOTE — TELEPHONE ENCOUNTER
Called the patient and spoke with his wife.  I instructed her that his INR is 2.7.  He is on Coumadin 4.5 mg.  I instructed her to have him continue to take the 4.5 mg and re-check in 1 week.

## 2020-07-14 NOTE — PATIENT INSTRUCTIONS

## 2020-07-14 NOTE — PROGRESS NOTES
Subjective:       Patient ID:  Rafa Tompkins is a 85 y.o. male who presents for   Chief Complaint   Patient presents with    Squamous Cell Carcinoma     Pt here today for ED&C Scc on right shin        Review of Systems   Constitutional: Negative for fever, chills and fatigue.   HENT: Negative for congestion and sore throat.    Respiratory: Negative for cough and shortness of breath.    Skin: Negative for itching, rash and dry skin.   Hematologic/Lymphatic: Bruises/bleeds easily.        Objective:    Physical Exam   Constitutional: He appears well-developed and well-nourished. No distress.   Eyes: No conjunctival no injection.   Cardiovascular: There is no local extremity swelling and no dependent edema.     Neurological: He is alert and oriented to person, place, and time. He is not disoriented.   Psychiatric: He has a normal mood and affect.   Skin:   Areas Examined (abnormalities noted in diagram):   RLE Inspected              Diagram Legend     Erythematous scaling macule/papule c/w actinic keratosis       Vascular papule c/w angioma      Pigmented verrucoid papule/plaque c/w seborrheic keratosis      Yellow umbilicated papule c/w sebaceous hyperplasia      Irregularly shaped tan macule c/w lentigo     1-2 mm smooth white papules consistent with Milia      Movable subcutaneous cyst with punctum c/w epidermal inclusion cyst      Subcutaneous movable cyst c/w pilar cyst      Firm pink to brown papule c/w dermatofibroma      Pedunculated fleshy papule(s) c/w skin tag(s)      Evenly pigmented macule c/w junctional nevus     Mildly variegated pigmented, slightly irregular-bordered macule c/w mildly atypical nevus      Flesh colored to evenly pigmented papule c/w intradermal nevus       Pink pearly papule/plaque c/w basal cell carcinoma      Erythematous hyperkeratotic cursted plaque c/w SCC      Surgical scar with no sign of skin cancer recurrence      Open and closed comedones      Inflammatory papules and  pustules      Verrucoid papule consistent consistent with wart     Erythematous eczematous patches and plaques     Dystrophic onycholytic nail with subungual debris c/w onychomycosis     Umbilicated papule    Erythematous-base heme-crusted tan verrucoid plaque consistent with inflamed seborrheic keratosis     Erythematous Silvery Scaling Plaque c/w Psoriasis     See annotation      Assessment / Plan:        Actinic keratoses  Recommended a full body skin check for moles and skin cancer screening to be done later.    History of nonmelanoma skin cancer  Recommended a full body skin check for moles and skin cancer screening to be done later.    History of melanoma  Recommended a full body skin check for moles and skin cancer screening to be done later.    SCC (squamous cell carcinoma)  Discussed with patient the etiology and pathogenesis of the disease or skin lesion(s) and possible treatments and aggravators.    Reviewed with patient different treatment options and associated risks.  Here for electrodesiccation and curettage of SCC on the r shin. bx done on earlier:  rev'd that we are opting for edc because of concerns with poor healing with tension and location with LE edema risk.  Elevate leg as much as possible during healing.      Electrodessication and Curettage Procedure note:    Verbal consent obtained. Lesional tissue marked and prepped with alcohol. Lesion anesthetized with 1% lidocaine with epinephrine. Curettage and Desiccation x 3 cycles to base. Aluminum chloride for hemostasis. Lesion size after primary curettage: 1.5 cm    Area bandaged and wound care explained.    F/u 3 months             Follow up in about 3 months (around 10/14/2020).

## 2020-07-26 NOTE — PROGRESS NOTES
"[facility] Hematology/Oncology  PROGRESS NOTE -  Follow-up Visit      Subjective:       Patient ID:   NAME: Rafa Tompkins : 1935     85 y.o. male    Referring Doc: Noam  Other Physicians: Pretty/tres, Corwin, Julian; Dori        Chief Complaint:   PE/DVT f/u    History of Present Illness:     Patient is being seen today in person. He is here by himself.        The patient is here today to go over the results of the recently ordered labs, tests and studies. He is here by himself. He is doing ok with no new issues. He denies any CP, SOB, HA's or N/V. He has been on coumadin per our direction.     No excessive bleeding or bruising.       He had on coumadin 4.5 mg po daily. His latest INR was a little elevated at 3.4         Discussed COVI19 precautions            ROS:   GEN: normal without any fever, night sweats or weight loss  HEENT: normal with no HA's, sore throat, stiff neck, changes in vision  CV: normal with no CP, SOB, PND, JEFF or orthopnea  PULM: normal with no SOB, cough, hemoptysis, sputum or pleuritic pain  GI: normal with no abdominal pain, nausea, vomiting, constipation, diarrhea, melanotic stools, BRBPR, or hematemesis  : normal with no hematuria, dysuria  BREAST: normal with no mass, discharge, pain  SKIN: normal with no rash, erythema, bruising, or swelling    Allergies:  Review of patient's allergies indicates:   Allergen Reactions    Penicillins        Medications:    Current Outpatient Medications:     BD LUER-GEOFFREY SYRINGE 3 mL 20 x 1" Syrg, , Disp: , Rfl: 5    calcitRIOL (ROCALTROL) 0.25 MCG Cap, TK ONE C PO  Q WEEK, Disp: , Rfl: 4    doxazosin (CARDURA) 4 MG tablet, Take 4 mg by mouth once daily., Disp: , Rfl: 1    fluticasone propionate (FLONASE) 50 mcg/actuation nasal spray, by Nasal route., Disp: , Rfl:     folic acid-vit B6-vit B12 2.5-25-2 mg (FOLBIC OR EQUIV) 2.5-25-2 mg Tab, Take 1 tablet by mouth once daily., Disp: 30 tablet, Rfl: 6    ketoconazole (NIZORAL) 2 % cream, " Apply to itchy red scaly areas of the face twice daily, Disp: 60 g, Rfl: 3    ketoconazole (NIZORAL) 2 % shampoo, Wash hair with medicated shampoo at least 2x/week - let sit on scalp at least 5 minutes prior to rinsing, Disp: 120 mL, Rfl: 5    lisinopril (PRINIVIL,ZESTRIL) 2.5 MG tablet, , Disp: , Rfl: 3    sildenafiL (VIAGRA) 50 MG tablet, Take 1 tablet (50 mg total) by mouth daily as needed for Erectile Dysfunction., Disp: 10 tablet, Rfl: 10    simvastatin (ZOCOR) 40 MG tablet, Take 40 mg by mouth every evening.  , Disp: , Rfl:     tamsulosin (FLOMAX) 0.4 mg Cap, Take 1 capsule by mouth once daily., Disp: , Rfl:     testosterone cypionate (DEPOTESTOTERONE CYPIONATE) 200 mg/mL injection, every 28 days. , Disp: , Rfl: 2    timolol maleate 0.5% (TIMOPTIC) 0.5 % Drop, , Disp: , Rfl: 1    TRETINOIN (RETIN-A TOP), Apply topically., Disp: , Rfl:     warfarin (COUMADIN) 1 MG tablet, TAKE A HALF TABLET BY MOUTH EVERY DAY, Disp: , Rfl:     warfarin (COUMADIN) 2 MG tablet, 1 TABLET BY MOUTH EVERY SUNDAY, TUESDAY, THURSDAY, AND SATURDAY, Disp: 30 tablet, Rfl: 1    warfarin (COUMADIN) 4 MG tablet, TAKE 1 TABLET BY MOUTH MONDAY, WEDNESDAY, AND FRIDAY, Disp: 30 tablet, Rfl: 6    PMHx/PSHx Updates:  See patient's last visit with me on 4/6/2020.  See H&P on 2/12/2019        Pathology:  Cancer Staging  No matching staging information was found for the patient.          Objective:     Vitals:  Blood pressure 133/68, pulse 71, temperature 96.4 °F (35.8 °C), resp. rate 19, weight 103.4 kg (228 lb).        Physical Examination:   GEN: no apparent distress, comfortable; AAOx3  HEAD: atraumatic and normocephalic  EYES: no conjunctival pallor or muddiness, no icterus; normal pupil reaction to ambient light  ENT: OMM, no pharyngeal erythema, external bilateral ears WNL; no visible thrush or ulcers  NECK: no masses or swelling, trachea midline, no visible LAD/LN's   CV: no palpitations; no pedal edema; no noticeable JVD or neck  vein distension; chronic + murmur  CHEST: Normal respiratory effort; chest wall breath movements symmetrical; no audible wheezing  ABDOM: non-distended; no bloating  MUSC/Skeletal: arthropathy and uses cane to ambulate  EXTREM: no clubbing, cyanosis, inflammation; chronic swelling in LLE  SKIN: no rashes, lesions, ulcers, petechiae or subcutaneous nodules; chronic skin changes on feet/ankles  : no kowalski  NEURO: moving all 4 extremities; AAOx3; no tremors  PSYCH: normal mood, affect and behavior  LYMPH: no visible LN's or LAD               Labs:       Lab Results   Component Value Date    INR 3.4 (H) 07/25/2020    INR 2.7 (H) 06/29/2020    INR 1.7 (H) 06/16/2020       3/3/2020 on chart from Dr aiken    10/28/2019  Lab Results   Component Value Date    WBC 8.3 10/28/2019    HGB 14.7 10/28/2019    HCT 43.9 10/28/2019    MCV 97.6 10/28/2019     10/28/2019     CMP  Sodium   Date Value Ref Range Status   10/28/2019 140 135 - 146 mmol/L Final     Potassium   Date Value Ref Range Status   10/28/2019 4.5 3.5 - 5.3 mmol/L Final     Chloride   Date Value Ref Range Status   10/28/2019 106 98 - 110 mmol/L Final     CO2   Date Value Ref Range Status   10/28/2019 26 20 - 32 mmol/L Final     Glucose   Date Value Ref Range Status   10/28/2019 88 65 - 139 mg/dL Final     Comment:               Non-fasting reference interval          BUN, Bld   Date Value Ref Range Status   10/28/2019 28 (H) 7 - 25 mg/dL Final     Creatinine   Date Value Ref Range Status   10/28/2019 1.51 (H) 0.70 - 1.11 mg/dL Final     Comment:     For patients >49 years of age, the reference limit  for Creatinine is approximately 13% higher for people  identified as -American.        11/19/2012 1.6 (H) 0.5 - 1.4 mg/dL Final     Calcium   Date Value Ref Range Status   10/28/2019 9.3 8.6 - 10.3 mg/dL Final   11/19/2012 8.5 (L) 8.7 - 10.5 mg/dL Final     Total Protein   Date Value Ref Range Status   10/28/2019 6.9 6.1 - 8.1 g/dL Final     Albumin    Date Value Ref Range Status   10/28/2019 4.0 3.6 - 5.1 g/dL Final     Total Bilirubin   Date Value Ref Range Status   10/28/2019 0.5 0.2 - 1.2 mg/dL Final     Alkaline Phosphatase   Date Value Ref Range Status   10/28/2019 51 40 - 115 U/L Final   11/19/2012 59 55 - 135 U/L Final     AST   Date Value Ref Range Status   10/28/2019 23 10 - 35 U/L Final   11/19/2012 27 10 - 40 U/L Final     ALT   Date Value Ref Range Status   10/28/2019 19 9 - 46 U/L Final     Anion Gap   Date Value Ref Range Status   10/12/2014 12 8 - 16 mmol/L Final   11/19/2012 10 5 - 15 meq/L Final     eGFR if    Date Value Ref Range Status   10/28/2019 49 (L) > OR = 60 mL/min/1.73m2 Final     eGFR if non    Date Value Ref Range Status   10/28/2019 42 (L) > OR = 60 mL/min/1.73m2 Final     Lab Results   Component Value Date    INR 3.4 (H) 07/25/2020    INR 2.7 (H) 06/29/2020    INR 1.7 (H) 06/16/2020         Radiology/Diagnostic Studies:    No results found.    I have reviewed all available lab results and radiology reports.    Assessment/Plan:   (1) 85 y.o. male with diagnosis of pulmonary emboli and DVT who has been referred by Dr Santacruz for evaluation by medical hematology/oncology.   -  He was found to have a LLE DVT in the distal superficial femoral and popliteal veins back in Nov 2017.  -  He also had a JONI and RLL pulmonary emboli at the same time   - he has been on coumadin since Nov 2017  - he has MTHFR-C homozygous positive  - we discussed the genetic implications of the MTHFR in children and siblings  - his homocysteine was elevated at 13.6  - factor V leiden was negative  - latest INR was 3.4 and a little elevated - he has been on 4 1/2 mg daily - will hold x 2 days then reduce dose to 4mg po daily and recheck in one week  - discussed consideration for some of the newer anticoag agents but he is not inclined at this time        (2) CRI - followed by Dr Meadows     (3) HTN and hypercholesterolemia     (4)  Peptic ulcer disease     (5) Arthritis     (6) Hypogonadism     (7) Hx/of Squamous cell skin ca involving the left temple/ 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 Continue folbic  2. Continue management of the coumadin per patient's request and monitor the INR as instructed -  latest INR was 3.4 and a little elevated - he has been on 4 1/2 mg daily - will hold x 2 days then reduce dose to 4mg po daily and recheck in one week  3. I recommend consideration for continual anticoagulation (possibly life-long)  4. F/u with cardiology       RTC in 3 months  Fax note to  Randy Santacruz MD, Corwin Ramírez Lam; [place]      Discussion:       Total Time spent on patient:    I spent over 15 mins of time with the patient. Reviewed results of the recently ordered labs, tests, reports and studies; made directives with regards to the results. Over half of this time was spent couseling and coordinating care, making treatment and analytical decisions; ordering necessary labs, tests and studies; and discussing treatment options and setting up treatment plan(s) if indicated.      COVID-19 Discussion:    I had long discussion with patient and any applicable family about the COVID-19 coronavirus epidemic and the recommended precautions with regard to cancer and/or hematology patients. I have re-iterated the CDC recommendations for  adequate hand washing, use of hand -like products, and coughing into elbow, etc. In addition, especially for our patients who are on chemotherapy and/or our otherwise immunocompromised patients, I have recommended avoidance of crowds, including movie theaters, restaurants, churches, etc. I have recommended avoidance of any sick or symptomatic family members and/or friends. I have also recommended avoidance of any raw and unwashed food products, and general avoidance of food items that have not been prepared by themselves. The patient has been asked to call us immediately with any symptom developments, issues, questions or other general concerns.     Anticoagulation Discussion:    I had long discussion with patient and any applicable family members about the benefit and/or need for anticoagulation. I communicated about the risks of bleeding while on any anticoagulation, which could be serious and/or life-threatening, and which can occur at any time, regardless of degree of the level of anticoagulation. I expressed the need for compliance with any anticoagulation regimen and that failure to do so could potential lead to excessive bleeding, and risk to health and/or life. In particular, with patients on coumadin therapy, compliance with requested blood work is absolutely essential, as coumadin levels can vary from time to time, and failure to do so could potentially place the patient at risk for bleeding and/or clotting events which could be fatal. Patients on coumadin are encouraged to call the day after they have their levels drawn, as to obtain the appropriate instructions from my staff. Patients are aware that self-regulating or self-dosing of their medications is strictly prohibited.         I have explained all of the above in detail and the patient understands all of the current recommendation(s). I have answered all of their questions to the best of my ability and to their complete satisfaction.   The  patient is to continue with the current management plan.            Electronically signed by Doe Hernández MD

## 2020-07-28 NOTE — TELEPHONE ENCOUNTER
Called the patient and instructed him about his INR of 3.4.  He told me that Dr. Hernández told him to hold his Coumadin for 2 days and restart it in 2 days at 4 mg and re-check his INR in 1 week.  Patient verbalized understanding.

## 2020-07-28 NOTE — TELEPHONE ENCOUNTER
----- Message from Doe Hernández MD sent at 7/26/2020  3:23 PM CDT -----  Do we manage his coumadin? If so, it is too high

## 2020-08-10 NOTE — TELEPHONE ENCOUNTER
----- Message from Doe Hernández MD sent at 8/7/2020  8:05 AM CDT -----  Are we monitoring his coumadin ?        Jose Alejandro called me back this morning. He has been on the 4mg x 1 weeks and so I told him his INR is good at 2.5. he is to continue 4mg daily and recheck his INR in 2 weeks. He voiced understanding.

## 2020-08-11 NOTE — TELEPHONE ENCOUNTER
----- Message from Doe Hernández MD sent at 8/7/2020  8:05 AM CDT -----  Are we monitoring his coumadin ?

## 2020-08-31 NOTE — TELEPHONE ENCOUNTER
----- Message from Doe Hernández MD sent at 8/30/2020  1:07 PM CDT -----  Are we managing his coumadin? INR is too high

## 2020-09-09 NOTE — TELEPHONE ENCOUNTER
Called the patient and instructed him that his INR is 3.4.  He is presently taking 4 mg.  I asked him if he is doing anything different.  He instructed me that he is taking Cipro for a UTI.  I asked him when is he finishing his Cipro.  He instructed me on Friday.  I told him to hold his Coumadin for 2 days then re-start his Coumadin @ 4 mg and re-check in 1 week.

## 2020-09-09 NOTE — TELEPHONE ENCOUNTER
----- Message from Essie Hooks sent at 9/8/2020 12:02 PM CDT -----  The patient would like a call back with the results of his PT INR done Saturday at [facility]. Please call him at [phone].

## 2020-09-09 NOTE — TELEPHONE ENCOUNTER
----- Message from Christy Clarke, Patient Care Assistant sent at 9/9/2020 10:58 AM CDT -----  Patient called in for lab results,  And need to know if he can resume his warfarin . He can be reached at [phone]

## 2020-10-02 NOTE — TELEPHONE ENCOUNTER
INR 1.5. Patient instructed to increase from 4 mg to 4.5mg and recheck in 1 week per Amanda.  Patient verbalized understanding.

## 2020-10-13 NOTE — PROGRESS NOTES
"  Subjective:       Patient ID:  Rafa Tompkins is a 85 y.o. male who presents for   Chief Complaint   Patient presents with    Skin Check     UBSE     LOV: 7-14-20 Dr. Gutiérrez ED&C SCC right shin    Skin, right shin, biopsy: 06/30/2020  -SQUAMOUS CELL CARCINOMA, INVASIVE, MODERATELY DIFFERENTIATED, EXTENDING TO   THE DEEP MARGIN AND CLOSE TO A PERIPHERAL MARGIN     Patient here today for 3 month F/U. Says" healed well" Denies any new areas of concern.  This is a high risk patient here to check for the development of new lesions.  New complaints  Biopsy of isolated BCC like lesion 09/2018 with unexpected path of atypical lymphohistiocytic infiltrate on R upper back, monitoring site    H/o AKs treated with cryo and bx of small SCCIS left neck, neg biopsy margins, monitoring  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, neg bx margins, monitoring  Melanoma (C43.9) 2013 right side of [facility]  Squamous cell carcinoma (C80.1) 2013 right lower leg    SCC (squamous cell carcinoma) (C44.92) 1995 left temple          FINAL PATHOLOGIC DIAGNOSIS  1. Skin, left upper arm, shave biopsy:  -LICHENOID KERATOSIS, see comment  COMMENT (part 1): The finding of a lichenoid dermatitis pattern in a solitary lesion is characteristic of a lichenoid  keratosis. Some of these lesions are secondary changes in seborrheic keratoses, solar lentigos, verrucae, and  other conditions. A similar histology with multiple lesions leads to a broader differential diagnosis , including lichen  planus and other lichenoid conditions. Correlation is suggested.  2. Skin, right upper back, shave biopsy:  -ATYPICAL LYMPHOHISTIOCYTIC INFILTRATE, see comment  COMMENT (part 2): Although the infiltrate is predominantly composed of T-cells (with an increased CD4 to CD8  ratio) and shows some loss of CD7, no clonal T-cell receptor gene rearrangement was detected (see " [place] [facility]  report). 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 These proliferations may be caused by, but  not limited to, drug reactions (usually anticonvulsant drugs or angiotensin-converting enzyme inhibitor), allergic  contact dermatitis, insect bites, actinic reticuloid, and idiopathic. Clinical correlation and follow up for recurrent or  other similar lesions is recommended.    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 + hypercoagulable state (Dr Johnson) he has MTHFR-C homozygous positive              Review of Systems   Constitutional: Negative for fever, chills and fatigue.   HENT: Negative for congestion and sore throat.    Respiratory: Negative for cough and shortness of breath.    Skin: Negative for itching, rash and dry skin.   Hematologic/Lymphatic: Bruises/bleeds easily.        Objective:    Physical Exam   Constitutional: He appears well-developed and well-nourished. No distress.   Neurological: He is alert and oriented to person, place, and time. He is not disoriented.   Psychiatric: He has a normal mood and affect.   Skin:   Areas Examined (abnormalities noted in diagram):   Scalp / Hair Palpated and Inspected  Head / Face Inspection Performed  Neck Inspection Performed  Chest / Axilla Inspection Performed  Abdomen Inspection Performed  Back Inspection Performed  RUE Inspected  LUE Inspection Performed  RLE Inspected  LLE Inspection Performed  Nails and Digits Inspection Performed                       Diagram Legend     Erythematous scaling macule/papule c/w actinic keratosis       Vascular papule c/w angioma      Pigmented verrucoid papule/plaque c/w seborrheic keratosis      Yellow umbilicated papule c/w sebaceous hyperplasia      Irregularly shaped tan macule c/w lentigo     1-2 mm smooth white papules consistent with Milia      Movable subcutaneous cyst with punctum c/w epidermal  inclusion cyst      Subcutaneous movable cyst c/w pilar cyst      Firm pink to brown papule c/w dermatofibroma      Pedunculated fleshy papule(s) c/w skin tag(s)      Evenly pigmented macule c/w junctional nevus     Mildly variegated pigmented, slightly irregular-bordered macule c/w mildly atypical nevus      Flesh colored to evenly pigmented papule c/w intradermal nevus       Pink pearly papule/plaque c/w basal cell carcinoma      Erythematous hyperkeratotic cursted plaque c/w SCC      Surgical scar with no sign of skin cancer recurrence      Open and closed comedones      Inflammatory papules and pustules      Verrucoid papule consistent consistent with wart     Erythematous eczematous patches and plaques     Dystrophic onycholytic nail with subungual debris c/w onychomycosis     Umbilicated papule    Erythematous-base heme-crusted tan verrucoid plaque consistent with inflamed seborrheic keratosis     Erythematous Silvery Scaling Plaque c/w Psoriasis     See annotation          Assessment / Plan:      Pathology Orders:     Normal Orders This Visit    Specimen to Pathology, Dermatology     Questions:    Procedure Type: Dermatology and skin neoplasms    Number of Specimens: 1    ------------------------: -------------------------    Spec 1 Procedure: Biopsy    Spec 1 Clinical Impression: r/o BCC    Spec 1 Source: left upper back        Neoplasm of uncertain behavior of skin  -     Specimen to Pathology, Dermatology  Shave biopsy procedure note:    Shave biopsy performed after verbal consent including risk of infection, scar, recurrence, need for additional treatment of site. Area prepped with alcohol, anesthetized with approximately 1.0cc of 1% lidocaine with epinephrine. Lesional tissue shaved with razor blade. Hemostasis achieved with application of aluminum chloride followed by hyfrecation. No complications. Dressing applied. Wound care explained.    Actinic keratoses  Cryosurgery Procedure Note    Verbal consent  from the patient is obtained and the patient is aware of the precancerous quality and need for treatment of these lesions. Liquid nitrogen cryosurgery is applied to the 4 actinic keratoses, as detailed in the physical exam, to produce a freeze injury. The patient is aware that blisters may form and is instructed on wound care with gentle cleansing and use of vaseline ointment to keep moist until healed. The patient is supplied a handout on cryosurgery and is instructed to call if lesions do not completely resolve.    History of nonmelanoma skin cancer  History of melanoma  Area of previous melanoma and NMSCs examined. Site well healed with no signs of recurrence.    Total body skin examination performed today including at least 12 points as noted in physical examination. Lesion suspicious for malignancy noted.    Seborrheic keratoses  These are benign inherited growths without a malignant potential. Reassurance given to patient. No treatment is necessary.     Milia  Multiple, truncal,  Chronic, unchanged x decades  Testosterone injection contribute?    Patient instructed in importance in daily sun protection of at least spf 30. Mineral sunscreen ingredients preferred (Zinc +/- Titanium).   Recommend Elta MD for daily use on face and neck.  Patient encouraged to wear hat for all outdoor exposure.   Also discussed sun avoidance and use of protective clothing.             Follow up in about 6 months (around 4/13/2021).

## 2020-10-13 NOTE — PATIENT INSTRUCTIONS
Shave Biopsy Wound Care    Your doctor has performed a shave biopsy today.  A band aid and vaseline ointment has been placed over the site.  This should remain in place for 24 hours.  It is recommended that you keep the area dry for the first 24 hours.  After 24 hours, you may remove the band aid and wash the area with warm soap and water and apply Vaseline jelly.  Many patients prefer to use Neosporin or Bacitracin ointment.  This is acceptable; however, know that you can develop an allergy to this medication even if you have used it safely for years.  It is important to keep the area moist.  Letting it dry out and get air slows healing time, and will worsen the scar.  Band aid is optional after first 24 hours.      If you notice increasing redness, tenderness, pain, or yellow drainage at the biopsy site, please notify your doctor.  These are signs of an infection.    If your biopsy site is bleeding, apply firm pressure for 15 minutes straight.  Repeat for another 15 minutes, if it is still bleeding.   If the surgical site continues to bleed, then please contact your doctor.       [facility]  [facility] - DERMATOLOGY  [address] [place] [address], [address] [address]  Dept: [phone]

## 2020-10-20 NOTE — TELEPHONE ENCOUNTER
Called the patient and instructed that the INR is 3.6.  I instructed him to hold it for 2 days.  He may re-start his Coumadin @ 4 mg and re-check his INR in 1 week.

## 2020-10-20 NOTE — TELEPHONE ENCOUNTER
----- Message from Christy Clarke, Patient Care Assistant sent at 10/20/2020 11:45 AM CDT -----  Toshia, patient called in stating he would like to speak to you regarding his PT-INR , he would like  to know does he need to Adjust his Warfarin . He can be reached at [phone]

## 2020-11-06 NOTE — TELEPHONE ENCOUNTER
Patient called the office and wanted to know what to do about his Coumadin.  He instructed me that he had it done on Saturday.  Patient's INR is 4.9.  I instructed him to hold his Coumadin and Monday re-check his INR.   I instructed him not to take any Coumadin until I call him back.  I instructed him if he starts to have any bleeding to go to the ER.

## 2020-11-10 NOTE — TELEPHONE ENCOUNTER
Called the patient and instructed him that his INR is 1.6.  I instructed him to re-start his Coumadin at 4 mg and re-check his INR in 1 week.

## 2020-11-10 NOTE — TELEPHONE ENCOUNTER
----- Message from Doe Hernández MD sent at 11/10/2020 11:21 AM CST -----  INR is a little low this time around

## 2020-11-16 NOTE — PROGRESS NOTES
"[facility] Hematology/Oncology  PROGRESS NOTE -  Follow-up Visit      Subjective:       Patient ID:   NAME: Rafa Tompkins : 1935     85 y.o. male    Referring Doc: Noam  Other Physicians: Pretty/tres, Corwin, Julian; Dori        Chief Complaint:   PE/DVT f/u    History of Present Illness:     Patient is being seen today in person. He is here by himself.      The patient is here today to go over the results of the recently ordered labs, tests and studies. He is here by himself. He is doing ok with no new issues. He denies any CP, SOB, HA's or N/V. He has been on coumadin per our direction.     No excessive bleeding or bruising.       He had some recent adjustments with his coumadin dose     Discussed COVI19 precautions            ROS:   GEN: normal without any fever, night sweats or weight loss  HEENT: normal with no HA's, sore throat, stiff neck, changes in vision  CV: normal with no CP, SOB, PND, JEFF or orthopnea  PULM: normal with no SOB, cough, hemoptysis, sputum or pleuritic pain  GI: normal with no abdominal pain, nausea, vomiting, constipation, diarrhea, melanotic stools, BRBPR, or hematemesis  : normal with no hematuria, dysuria  BREAST: normal with no mass, discharge, pain  SKIN: normal with no rash, erythema, bruising, or swelling    Allergies:  Review of patient's allergies indicates:   Allergen Reactions    Penicillins        Medications:    Current Outpatient Medications:     BD LUER-GEOFFREY SYRINGE 3 mL 20 x 1" Syrg, , Disp: , Rfl: 5    calcitRIOL (ROCALTROL) 0.25 MCG Cap, TK ONE C PO  Q WEEK, Disp: , Rfl: 4    doxazosin (CARDURA) 4 MG tablet, Take 4 mg by mouth once daily., Disp: , Rfl: 1    fluticasone propionate (FLONASE) 50 mcg/actuation nasal spray, by Nasal route., Disp: , Rfl:     folic acid-vit B6-vit B12 2.5-25-2 mg (FOLBIC OR EQUIV) 2.5-25-2 mg Tab, Take 1 tablet by mouth once daily., Disp: 30 tablet, Rfl: 6    ketoconazole (NIZORAL) 2 % cream, Apply to itchy red scaly areas of " the face twice daily, Disp: 60 g, Rfl: 3    ketoconazole (NIZORAL) 2 % shampoo, Wash hair with medicated shampoo at least 2x/week - let sit on scalp at least 5 minutes prior to rinsing, Disp: 120 mL, Rfl: 5    lisinopril (PRINIVIL,ZESTRIL) 2.5 MG tablet, , Disp: , Rfl: 3    sildenafiL (VIAGRA) 50 MG tablet, Take 1 tablet (50 mg total) by mouth daily as needed for Erectile Dysfunction., Disp: 10 tablet, Rfl: 10    simvastatin (ZOCOR) 40 MG tablet, Take 40 mg by mouth every evening.  , Disp: , Rfl:     tamsulosin (FLOMAX) 0.4 mg Cap, Take 1 capsule by mouth once daily., Disp: , Rfl:     testosterone cypionate (DEPOTESTOTERONE CYPIONATE) 200 mg/mL injection, every 28 days. , Disp: , Rfl: 2    timolol maleate 0.5% (TIMOPTIC) 0.5 % Drop, , Disp: , Rfl: 1    TRETINOIN (RETIN-A TOP), Apply topically., Disp: , Rfl:     warfarin (COUMADIN) 1 MG tablet, TAKE A HALF-TABLET BY MOUTH EVERY DAY, Disp: 45 tablet, Rfl: 2    warfarin (COUMADIN) 2 MG tablet, 1 TABLET BY MOUTH EVERY SUNDAY, TUESDAY, THURSDAY, AND SATURDAY, Disp: 30 tablet, Rfl: 1    warfarin (COUMADIN) 4 MG tablet, TAKE 1 TABLET BY MOUTH MONDAY, WEDNESDAY, AND FRIDAY, Disp: 30 tablet, Rfl: 6    PMHx/PSHx Updates:  See patient's last visit with me on 7/27/2020.  See H&P on 2/12/2019        Pathology:  Cancer Staging  No matching staging information was found for the patient.          Objective:     Vitals:  Blood pressure 135/72, pulse 97, temperature 97.2 °F (36.2 °C), resp. rate 18, weight 100.2 kg (221 lb).        Physical Examination:   GEN: no apparent distress, comfortable; AAOx3; overweight  HEAD: atraumatic and normocephalic  EYES: no conjunctival pallor or muddiness, no icterus; normal pupil reaction to ambient light  ENT: OMM, no pharyngeal erythema, external bilateral ears WNL; no visible thrush or ulcers  NECK: no masses or swelling, trachea midline, no visible LAD/LN's   CV: no palpitations; no pedal edema; no noticeable JVD or neck vein  distension; chronic + murmur  CHEST: Normal respiratory effort; chest wall breath movements symmetrical; no audible wheezing  ABDOM: non-distended; no bloating  MUSC/Skeletal: arthropathy and uses cane to ambulate  EXTREM: no clubbing, cyanosis, inflammation; chronic swelling in LLE  SKIN: no rashes, lesions, ulcers, petechiae or subcutaneous nodules; chronic skin changes on feet/ankles  : no kowalski  NEURO: moving all 4 extremities; AAOx3; no tremors  PSYCH: normal mood, affect and behavior  LYMPH: no visible LN's or LAD               Labs:       Lab Results   Component Value Date    INR 1.6 (H) 11/09/2020    INR 4.9 (H) 10/30/2020    INR 3.6 (H) 10/15/2020               Lab Results   Component Value Date    WBC 8.2 08/06/2020    HGB 14.6 08/06/2020    HCT 44.1 08/06/2020    MCV 99.3 08/06/2020     08/06/2020     CMP  Sodium   Date Value Ref Range Status   08/06/2020 137 135 - 146 mmol/L Final     Potassium   Date Value Ref Range Status   08/06/2020 4.9 3.5 - 5.3 mmol/L Final     Chloride   Date Value Ref Range Status   08/06/2020 105 98 - 110 mmol/L Final     CO2   Date Value Ref Range Status   08/06/2020 25 20 - 32 mmol/L Final     Glucose   Date Value Ref Range Status   08/06/2020 86 65 - 139 mg/dL Final     Comment:               Non-fasting reference interval          BUN   Date Value Ref Range Status   08/06/2020 25 7 - 25 mg/dL Final     Creatinine   Date Value Ref Range Status   08/06/2020 1.60 (H) 0.70 - 1.11 mg/dL Final     Comment:     For patients >49 years of age, the reference limit  for Creatinine is approximately 13% higher for people  identified as -American.        11/19/2012 1.6 (H) 0.5 - 1.4 mg/dL Final     Calcium   Date Value Ref Range Status   08/06/2020 9.0 8.6 - 10.3 mg/dL Final   11/19/2012 8.5 (L) 8.7 - 10.5 mg/dL Final     Total Protein   Date Value Ref Range Status   08/06/2020 6.5 6.1 - 8.1 g/dL Final     Albumin   Date Value Ref Range Status   08/06/2020 3.9 3.6 - 5.1  g/dL Final     Total Bilirubin   Date Value Ref Range Status   08/06/2020 0.5 0.2 - 1.2 mg/dL Final     Alkaline Phosphatase   Date Value Ref Range Status   08/06/2020 45 35 - 144 U/L Final   11/19/2012 59 55 - 135 U/L Final     AST   Date Value Ref Range Status   08/06/2020 18 10 - 35 U/L Final   11/19/2012 27 10 - 40 U/L Final     ALT   Date Value Ref Range Status   08/06/2020 12 9 - 46 U/L Final     Anion Gap   Date Value Ref Range Status   10/12/2014 12 8 - 16 mmol/L Final   11/19/2012 10 5 - 15 meq/L Final     eGFR if    Date Value Ref Range Status   08/06/2020 45 (L) > OR = 60 mL/min/1.73m2 Final     eGFR if non    Date Value Ref Range Status   08/06/2020 39 (L) > OR = 60 mL/min/1.73m2 Final     Lab Results   Component Value Date    INR 1.6 (H) 11/09/2020    INR 4.9 (H) 10/30/2020    INR 3.6 (H) 10/15/2020         Radiology/Diagnostic Studies:    No results found.    I have reviewed all available lab results and radiology reports.    Assessment/Plan:   (1) 85 y.o. male with diagnosis of pulmonary emboli and DVT who has been referred by Dr Santacruz for evaluation by medical hematology/oncology.   -  He was found to have a LLE DVT in the distal superficial femoral and popliteal veins back in Nov 2017.  -  He also had a JONI and RLL pulmonary emboli at the same time   - he has been on coumadin since Nov 2017  - he has MTHFR-C homozygous positive  - we discussed the genetic implications of the MTHFR in children and siblings  - his homocysteine was elevated at 13.6  - factor V leiden was negative  - latest INR was 3.4 and a little elevated - he has been on 4 1/2 mg daily - will hold x 2 days then reduce dose to 4mg po daily and recheck in one week  - discussed consideration for some of the newer anticoag agents but he is not inclined at this time    11/17/2020:  - latest INR at 1.6  - increase to 4.5mg M/W/F and 4.0 mg po daily every other day  - discussed consideration to changing to  a next generation oral anticoagulant and he is willing to consider  - write script for eliquis 5mg po bid and see if they can get cost        (2) CRI - followed by Dr Meadows     (3) HTN and hypercholesterolemia     (4) Peptic ulcer disease     (5) Arthritis     (6) Hypogonadism     (7) Hx/of Squamous cell skin ca involving the left temple/ 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 Continue folbic  2. Continue management of the coumadin per patient's request and monitor the INR as instructed - 4.5mg po MWF and 4.0po every other day  3. I recommend consideration for continual anticoagulation (possibly life-long) - gave patient script for eliquis to see what his cost would be  4. F/u with cardiology       RTC in 3 months  Fax note to  Randy Santacruz MD, Corwin Ramírez, [place]; [place]      Discussion:       Total Time spent on patient:    I spent over 15 mins of time with the patient. Reviewed results of the recently ordered labs, tests, reports and studies; made directives with regards to the results. Over half of this time was spent couseling and coordinating care, making treatment and analytical decisions; ordering necessary labs, tests and studies; and discussing treatment options and setting up treatment plan(s) if indicated.      COVID-19 Discussion:    I had long discussion with patient and any applicable family about the COVID-19 coronavirus epidemic and the recommended  precautions with regard to cancer and/or hematology patients. I have re-iterated the CDC recommendations for adequate hand washing, use of hand -like products, and coughing into elbow, etc. In addition, especially for our patients who are on chemotherapy and/or our otherwise immunocompromised patients, I have recommended avoidance of crowds, including movie theaters, restaurants, churches, etc. I have recommended avoidance of any sick or symptomatic family members and/or friends. I have also recommended avoidance of any raw and unwashed food products, and general avoidance of food items that have not been prepared by themselves. The patient has been asked to call us immediately with any symptom developments, issues, questions or other general concerns.     Anticoagulation Discussion:    I had long discussion with patient and any applicable family members about the benefit and/or need for anticoagulation. I communicated about the risks of bleeding while on any anticoagulation, which could be serious and/or life-threatening, and which can occur at any time, regardless of degree of the level of anticoagulation. I expressed the need for compliance with any anticoagulation regimen and that failure to do so could potential lead to excessive bleeding, and risk to health and/or life. In particular, with patients on coumadin therapy, compliance with requested blood work is absolutely essential, as coumadin levels can vary from time to time, and failure to do so could potentially place the patient at risk for bleeding and/or clotting events which could be fatal. Patients on coumadin are encouraged to call the day after they have their levels drawn, as to obtain the appropriate instructions from my staff. Patients are aware that self-regulating or self-dosing of their medications is strictly prohibited.         I have explained all of the above in detail and the patient understands all of the current  recommendation(s). I have answered all of their questions to the best of my ability and to their complete satisfaction.   The patient is to continue with the current management plan.            Electronically signed by Doe Hernández MD

## 2020-11-20 NOTE — TELEPHONE ENCOUNTER
Patient called today because he forgot what Dr. Hernández told him.  I told him that he needs to take 4.5 mg Coumadin on Ecrald-Bsdmuwitm-Qzwnzf.  Take 4 mg every Sunday, Tuesday, Thursday and Saturday.  I instructed the patient to re-check his INR on Monday.  Patient verbalized understanding.

## 2020-11-20 NOTE — TELEPHONE ENCOUNTER
----- Message from Essie Hooks sent at 11/20/2020 10:23 AM CST -----  The patient wants to know when he is supposed to have his PT INR checked again. He said the last time he had it done his coumadin dose was changed but he does not know when he needs to be retested.[phone]

## 2020-11-24 NOTE — TELEPHONE ENCOUNTER
Called the patient and instructed him that his INR is 2.7.  I verified the patient's dose of Coumadin as 4.5 mg on M-W-F and 4 mg on Tues, Thurs, Sat and Sun.  I instructed him to continue on that dose and re-check his INR in 1 week.

## 2020-11-24 NOTE — TELEPHONE ENCOUNTER
----- Message from Doe Hernández MD sent at 11/24/2020  8:35 AM CST -----  Do we monitor his coumadin? INR is adequate

## 2020-12-07 NOTE — TELEPHONE ENCOUNTER
----- Message from Doe Hernández MD sent at 12/6/2020  1:43 PM CST -----  INR is a little high; do we manage this for him ?

## 2020-12-07 NOTE — TELEPHONE ENCOUNTER
Called the patient and instructed him that his INR is high at 3.4.  I instructed him to hold the Coumadin for 2 days then start taking the 4 mg daily.  I instructed him to recheck in 1 week.

## 2020-12-17 NOTE — TELEPHONE ENCOUNTER
Called the patient and instructed him that his INR 2.6.  I instructed him to continue taking the 4 mg and re-check his INR in 2 weeks.

## 2020-12-17 NOTE — TELEPHONE ENCOUNTER
----- Message from Doe Hernández MD sent at 12/17/2020 10:28 AM CST -----  Do we manage his coumadin?

## 2021-01-01 ENCOUNTER — HOSPITAL ENCOUNTER (EMERGENCY)
Facility: HOSPITAL | Age: 86
End: 2021-07-07
Attending: EMERGENCY MEDICINE
Payer: MEDICARE

## 2021-01-01 ENCOUNTER — TELEPHONE (OUTPATIENT)
Dept: HEMATOLOGY/ONCOLOGY | Facility: CLINIC | Age: 86
End: 2021-01-01

## 2021-01-01 ENCOUNTER — CLINICAL SUPPORT (OUTPATIENT)
Dept: CARDIOLOGY | Facility: HOSPITAL | Age: 86
End: 2021-01-01
Payer: MEDICARE

## 2021-01-01 ENCOUNTER — OFFICE VISIT (OUTPATIENT)
Dept: DERMATOLOGY | Facility: CLINIC | Age: 86
End: 2021-01-01
Payer: MEDICARE

## 2021-01-01 ENCOUNTER — HOSPITAL ENCOUNTER (OUTPATIENT)
Dept: RADIOLOGY | Facility: HOSPITAL | Age: 86
Discharge: HOME | End: 2021-04-30
Payer: MEDICARE

## 2021-01-01 ENCOUNTER — IMMUNIZATION (OUTPATIENT)
Dept: PRIMARY CARE CLINIC | Facility: CLINIC | Age: 86
End: 2021-01-01
Payer: MEDICARE

## 2021-01-01 ENCOUNTER — HOSPITAL ENCOUNTER (OUTPATIENT)
Facility: HOSPITAL | Age: 86
Discharge: HOME | End: 2021-04-30
Attending: EMERGENCY MEDICINE | Admitting: INTERNAL MEDICINE
Payer: MEDICARE

## 2021-01-01 VITALS
OXYGEN SATURATION: 96 % | DIASTOLIC BLOOD PRESSURE: 73 MMHG | SYSTOLIC BLOOD PRESSURE: 121 MMHG | HEIGHT: 68 IN | WEIGHT: 227.94 LBS | TEMPERATURE: 98 F | RESPIRATION RATE: 16 BRPM | HEART RATE: 72 BPM | BODY MASS INDEX: 34.55 KG/M2

## 2021-01-01 DIAGNOSIS — I46.9 CARDIAC ARREST: Primary | ICD-10-CM

## 2021-01-01 DIAGNOSIS — Z85.820 HISTORY OF MELANOMA: ICD-10-CM

## 2021-01-01 DIAGNOSIS — R55 SYNCOPE: ICD-10-CM

## 2021-01-01 DIAGNOSIS — L72.0 MILIA: ICD-10-CM

## 2021-01-01 DIAGNOSIS — Z86.718 HISTORY OF DVT (DEEP VEIN THROMBOSIS): ICD-10-CM

## 2021-01-01 DIAGNOSIS — L82.1 SEBORRHEIC KERATOSES: ICD-10-CM

## 2021-01-01 DIAGNOSIS — Z85.828 HISTORY OF NONMELANOMA SKIN CANCER: ICD-10-CM

## 2021-01-01 DIAGNOSIS — R07.9 CHEST PAIN: ICD-10-CM

## 2021-01-01 DIAGNOSIS — Z86.711 HISTORY OF PULMONARY EMBOLUS (PE): Primary | ICD-10-CM

## 2021-01-01 DIAGNOSIS — L57.0 ACTINIC KERATOSES: Primary | ICD-10-CM

## 2021-01-01 DIAGNOSIS — Z23 NEED FOR VACCINATION: ICD-10-CM

## 2021-01-01 DIAGNOSIS — L90.5 SCAR: ICD-10-CM

## 2021-01-01 DIAGNOSIS — I82.402 ACUTE DEEP VEIN THROMBOSIS (DVT) OF LEFT LOWER EXTREMITY, UNSPECIFIED VEIN: ICD-10-CM

## 2021-01-01 DIAGNOSIS — R06.02 SHORTNESS OF BREATH: ICD-10-CM

## 2021-01-01 DIAGNOSIS — R40.20 LOSS OF CONSCIOUSNESS: ICD-10-CM

## 2021-01-01 DIAGNOSIS — R55 NEAR SYNCOPE: Primary | ICD-10-CM

## 2021-01-01 DIAGNOSIS — L30.4 INTERTRIGO: ICD-10-CM

## 2021-01-01 DIAGNOSIS — Z23 NEED FOR VACCINATION: Primary | ICD-10-CM

## 2021-01-01 LAB
ALBUMIN SERPL BCP-MCNC: 3.7 G/DL (ref 3.5–5.2)
ALBUMIN SERPL BCP-MCNC: 3.8 G/DL (ref 3.5–5.2)
ALP SERPL-CCNC: 43 U/L (ref 55–135)
ALP SERPL-CCNC: 49 U/L (ref 55–135)
ALT SERPL W/O P-5'-P-CCNC: 18 U/L (ref 10–44)
ALT SERPL W/O P-5'-P-CCNC: 19 U/L (ref 10–44)
ANION GAP SERPL CALC-SCNC: 10 MMOL/L (ref 8–16)
ANION GAP SERPL CALC-SCNC: 7 MMOL/L (ref 8–16)
AORTIC ROOT ANNULUS: 3.36 CM
AORTIC VALVE CUSP SEPERATION: 1.04 CM
AST SERPL-CCNC: 24 U/L (ref 10–40)
AST SERPL-CCNC: 25 U/L (ref 10–40)
AV INDEX (PROSTH): 0.2
AV MEAN GRADIENT: 68 MMHG
AV PEAK GRADIENT: 110 MMHG
AV VALVE AREA: 0.85 CM2
AV VELOCITY RATIO: 23.09
BACTERIA #/AREA URNS HPF: NEGATIVE /HPF
BACTERIA UR CULT: ABNORMAL
BASOPHILS # BLD AUTO: 0.05 K/UL (ref 0–0.2)
BASOPHILS # BLD AUTO: 0.05 K/UL (ref 0–0.2)
BASOPHILS NFR BLD: 0.5 % (ref 0–1.9)
BASOPHILS NFR BLD: 0.7 % (ref 0–1.9)
BILIRUB SERPL-MCNC: 0.7 MG/DL (ref 0.1–1)
BILIRUB SERPL-MCNC: 1 MG/DL (ref 0.1–1)
BILIRUB UR QL STRIP: NEGATIVE
BNP SERPL-MCNC: 325 PG/ML (ref 0–99)
BSA FOR ECHO PROCEDURE: 2.23 M2
BUN SERPL-MCNC: 24 MG/DL (ref 8–23)
BUN SERPL-MCNC: 24 MG/DL (ref 8–23)
CALCIUM SERPL-MCNC: 8.7 MG/DL (ref 8.7–10.5)
CALCIUM SERPL-MCNC: 8.7 MG/DL (ref 8.7–10.5)
CHLORIDE SERPL-SCNC: 105 MMOL/L (ref 95–110)
CHLORIDE SERPL-SCNC: 106 MMOL/L (ref 95–110)
CHOLEST SERPL-MCNC: 156 MG/DL (ref 120–199)
CHOLEST/HDLC SERPL: 3.6 {RATIO} (ref 2–5)
CLARITY UR: CLEAR
CO2 SERPL-SCNC: 24 MMOL/L (ref 23–29)
CO2 SERPL-SCNC: 27 MMOL/L (ref 23–29)
COLOR UR: YELLOW
CREAT SERPL-MCNC: 1.6 MG/DL (ref 0.5–1.4)
CREAT SERPL-MCNC: 1.7 MG/DL (ref 0.5–1.4)
CV ECHO LV RWT: 0.5 CM
CV PHARM DOSE: 0.4 MG
CV STRESS BASE HR: 80 BPM
DIASTOLIC BLOOD PRESSURE: 79 MMHG
DIFFERENTIAL METHOD: ABNORMAL
DIFFERENTIAL METHOD: ABNORMAL
DOP CALC AO PEAK VEL: 5.25 M/S
DOP CALC AO VTI: 131.04 CM
DOP CALC LVOT AREA: 4.3 CM2
DOP CALC LVOT DIAMETER: 2.34 CM
DOP CALC LVOT PEAK VEL: 121.24 M/S
DOP CALC LVOT STROKE VOLUME: 111.46 CM3
DOP CALCLVOT PEAK VEL VTI: 25.93 CM
E WAVE DECELERATION TIME: 327.37 MSEC
E/A RATIO: 0.68
E/E' RATIO: 13.67 M/S
ECHO LV POSTERIOR WALL: 1.21 CM (ref 0.6–1.1)
EJECTION FRACTION: 65 %
EOSINOPHIL # BLD AUTO: 0.2 K/UL (ref 0–0.5)
EOSINOPHIL # BLD AUTO: 0.3 K/UL (ref 0–0.5)
EOSINOPHIL NFR BLD: 3.3 % (ref 0–8)
EOSINOPHIL NFR BLD: 3.4 % (ref 0–8)
ERYTHROCYTE [DISTWIDTH] IN BLOOD BY AUTOMATED COUNT: 14.1 % (ref 11.5–14.5)
ERYTHROCYTE [DISTWIDTH] IN BLOOD BY AUTOMATED COUNT: 14.1 % (ref 11.5–14.5)
EST. GFR  (AFRICAN AMERICAN): 41.6 ML/MIN/1.73 M^2
EST. GFR  (AFRICAN AMERICAN): 44.7 ML/MIN/1.73 M^2
EST. GFR  (NON AFRICAN AMERICAN): 36 ML/MIN/1.73 M^2
EST. GFR  (NON AFRICAN AMERICAN): 38.7 ML/MIN/1.73 M^2
ESTIMATED AVG GLUCOSE: 105 MG/DL (ref 68–131)
FRACTIONAL SHORTENING: 18 % (ref 28–44)
GLUCOSE SERPL-MCNC: 106 MG/DL (ref 70–110)
GLUCOSE SERPL-MCNC: 108 MG/DL (ref 70–110)
GLUCOSE UR QL STRIP: NEGATIVE
HBA1C MFR BLD: 5.3 % (ref 4.5–6.2)
HCT VFR BLD AUTO: 36.1 % (ref 40–54)
HCT VFR BLD AUTO: 36.4 % (ref 40–54)
HDLC SERPL-MCNC: 43 MG/DL (ref 40–75)
HDLC SERPL: 27.6 % (ref 20–50)
HGB BLD-MCNC: 11.9 G/DL (ref 14–18)
HGB BLD-MCNC: 11.9 G/DL (ref 14–18)
HGB UR QL STRIP: NEGATIVE
HYALINE CASTS #/AREA URNS LPF: 0 /LPF
IMM GRANULOCYTES # BLD AUTO: 0.04 K/UL (ref 0–0.04)
IMM GRANULOCYTES # BLD AUTO: 0.07 K/UL (ref 0–0.04)
IMM GRANULOCYTES NFR BLD AUTO: 0.6 % (ref 0–0.5)
IMM GRANULOCYTES NFR BLD AUTO: 0.7 % (ref 0–0.5)
INR PPP: 2.6
INR PPP: 3.5
INR PPP: 3.8
INR PPP: 3.8
INTERVENTRICULAR SEPTUM: 1.2 CM (ref 0.6–1.1)
KETONES UR QL STRIP: NEGATIVE
LDLC SERPL CALC-MCNC: 98.4 MG/DL (ref 63–159)
LEFT ATRIUM SIZE: 3.8 CM
LEFT INTERNAL DIMENSION IN SYSTOLE: 3.97 CM (ref 2.1–4)
LEFT VENTRICLE MASS INDEX: 103 G/M2
LEFT VENTRICULAR INTERNAL DIMENSION IN DIASTOLE: 4.84 CM (ref 3.5–6)
LEFT VENTRICULAR MASS: 223.32 G
LEUKOCYTE ESTERASE UR QL STRIP: ABNORMAL
LV LATERAL E/E' RATIO: 13.67 M/S
LV SEPTAL E/E' RATIO: 13.67 M/S
LYMPHOCYTES # BLD AUTO: 1.3 K/UL (ref 1–4.8)
LYMPHOCYTES # BLD AUTO: 1.6 K/UL (ref 1–4.8)
LYMPHOCYTES NFR BLD: 13.2 % (ref 18–48)
LYMPHOCYTES NFR BLD: 21.8 % (ref 18–48)
MAGNESIUM SERPL-MCNC: 2.2 MG/DL (ref 1.6–2.6)
MCH RBC QN AUTO: 32.3 PG (ref 27–31)
MCH RBC QN AUTO: 32.6 PG (ref 27–31)
MCHC RBC AUTO-ENTMCNC: 32.7 G/DL (ref 32–36)
MCHC RBC AUTO-ENTMCNC: 33 G/DL (ref 32–36)
MCV RBC AUTO: 99 FL (ref 82–98)
MCV RBC AUTO: 99 FL (ref 82–98)
MICROSCOPIC COMMENT: ABNORMAL
MONOCYTES # BLD AUTO: 0.8 K/UL (ref 0.3–1)
MONOCYTES # BLD AUTO: 0.8 K/UL (ref 0.3–1)
MONOCYTES NFR BLD: 10.5 % (ref 4–15)
MONOCYTES NFR BLD: 8.2 % (ref 4–15)
MV PEAK A VEL: 1.21 M/S
MV PEAK E VEL: 0.82 M/S
NEUTROPHILS # BLD AUTO: 4.5 K/UL (ref 1.8–7.7)
NEUTROPHILS # BLD AUTO: 7.1 K/UL (ref 1.8–7.7)
NEUTROPHILS NFR BLD: 63 % (ref 38–73)
NEUTROPHILS NFR BLD: 74.1 % (ref 38–73)
NITRITE UR QL STRIP: POSITIVE
NONHDLC SERPL-MCNC: 113 MG/DL
NRBC BLD-RTO: 0 /100 WBC
NRBC BLD-RTO: 0 /100 WBC
OB PNL STL: POSITIVE
OHS CV CPX 1 MINUTE RECOVERY HEART RATE: 102 BPM
OHS CV CPX 85 PERCENT MAX PREDICTED HEART RATE MALE: 115
OHS CV CPX MAX PREDICTED HEART RATE: 135
OHS CV CPX PATIENT IS FEMALE: 0
OHS CV CPX PATIENT IS MALE: 1
OHS CV CPX PEAK DIASTOLIC BLOOD PRESSURE: 78 MMHG
OHS CV CPX PEAK HEAR RATE: 102 BPM
OHS CV CPX PEAK RATE PRESSURE PRODUCT: NORMAL
OHS CV CPX PEAK SYSTOLIC BLOOD PRESSURE: 130 MMHG
OHS CV CPX PERCENT MAX PREDICTED HEART RATE ACHIEVED: 76
OHS CV CPX RATE PRESSURE PRODUCT PRESENTING: 9840
PH UR STRIP: 7 [PH] (ref 5–8)
PISA TR MAX VEL: 2.66 M/S
PLATELET # BLD AUTO: 106 K/UL (ref 150–450)
PLATELET # BLD AUTO: 116 K/UL (ref 150–450)
PMV BLD AUTO: 11.9 FL (ref 9.2–12.9)
PMV BLD AUTO: 12 FL (ref 9.2–12.9)
POTASSIUM SERPL-SCNC: 4.2 MMOL/L (ref 3.5–5.1)
POTASSIUM SERPL-SCNC: 4.5 MMOL/L (ref 3.5–5.1)
PROT SERPL-MCNC: 6.5 G/DL (ref 6–8.4)
PROT SERPL-MCNC: 6.5 G/DL (ref 6–8.4)
PROT UR QL STRIP: NEGATIVE
PROTHROMBIN TIME: 25.1 SEC (ref 9–11.5)
PROTHROMBIN TIME: 33.1 SEC (ref 9–11.5)
PROTHROMBIN TIME: 35.7 SEC (ref 11.8–14.3)
PROTHROMBIN TIME: 36.3 SEC (ref 11.8–14.3)
PV PEAK VELOCITY: 139.33 CM/S
RA PRESSURE: 3 MMHG
RBC # BLD AUTO: 3.65 M/UL (ref 4.6–6.2)
RBC # BLD AUTO: 3.68 M/UL (ref 4.6–6.2)
RBC #/AREA URNS HPF: 1 /HPF (ref 0–4)
RIGHT VENTRICULAR END-DIASTOLIC DIMENSION: 303 CM
SARS-COV-2 RDRP RESP QL NAA+PROBE: NEGATIVE
SODIUM SERPL-SCNC: 139 MMOL/L (ref 136–145)
SODIUM SERPL-SCNC: 140 MMOL/L (ref 136–145)
SP GR UR STRIP: 1.01 (ref 1–1.03)
SQUAMOUS #/AREA URNS HPF: 1 /HPF
SYSTOLIC BLOOD PRESSURE: 123 MMHG
TDI LATERAL: 0.06 M/S
TDI SEPTAL: 0.06 M/S
TDI: 0.06 M/S
TR MAX PG: 28 MMHG
TRIGL SERPL-MCNC: 73 MG/DL (ref 30–150)
TROPONIN I SERPL DL<=0.01 NG/ML-MCNC: 0.04 NG/ML
TROPONIN I SERPL DL<=0.01 NG/ML-MCNC: 0.07 NG/ML
TROPONIN I SERPL DL<=0.01 NG/ML-MCNC: 0.09 NG/ML
TSH SERPL DL<=0.005 MIU/L-ACNC: 3.44 UIU/ML (ref 0.34–5.6)
TV REST PULMONARY ARTERY PRESSURE: 31 MMHG
URN SPEC COLLECT METH UR: ABNORMAL
UROBILINOGEN UR STRIP-ACNC: NEGATIVE EU/DL
WBC # BLD AUTO: 7.11 K/UL (ref 3.9–12.7)
WBC # BLD AUTO: 9.62 K/UL (ref 3.9–12.7)
WBC #/AREA URNS HPF: 15 /HPF (ref 0–5)

## 2021-01-01 PROCEDURE — 99220 PR INITIAL OBSERVATION CARE,LEVL III: CPT | Mod: 25,,, | Performed by: INTERNAL MEDICINE

## 2021-01-01 PROCEDURE — U0002 COVID-19 LAB TEST NON-CDC: HCPCS | Performed by: EMERGENCY MEDICINE

## 2021-01-01 PROCEDURE — 85025 COMPLETE CBC W/AUTO DIFF WBC: CPT | Performed by: EMERGENCY MEDICINE

## 2021-01-01 PROCEDURE — 25000003 PHARM REV CODE 250: Performed by: NURSE PRACTITIONER

## 2021-01-01 PROCEDURE — 80053 COMPREHEN METABOLIC PANEL: CPT | Performed by: EMERGENCY MEDICINE

## 2021-01-01 PROCEDURE — 84484 ASSAY OF TROPONIN QUANT: CPT | Performed by: NURSE PRACTITIONER

## 2021-01-01 PROCEDURE — 93016 NUCLEAR STRESS TEST (CUPID ONLY): ICD-10-PCS | Mod: ,,, | Performed by: INTERNAL MEDICINE

## 2021-01-01 PROCEDURE — 94761 N-INVAS EAR/PLS OXIMETRY MLT: CPT

## 2021-01-01 PROCEDURE — 93018 NUCLEAR STRESS TEST (CUPID ONLY): ICD-10-PCS | Mod: ,,, | Performed by: INTERNAL MEDICINE

## 2021-01-01 PROCEDURE — 87186 SC STD MICRODIL/AGAR DIL: CPT | Performed by: NURSE PRACTITIONER

## 2021-01-01 PROCEDURE — 93017 CV STRESS TEST TRACING ONLY: CPT

## 2021-01-01 PROCEDURE — 17000 PR DESTRUCTION(LASER SURGERY,CRYOSURGERY,CHEMOSURGERY),PREMALIGNANT LESIONS,FIRST LESION: ICD-10-PCS | Mod: S$GLB,,, | Performed by: DERMATOLOGY

## 2021-01-01 PROCEDURE — G0378 HOSPITAL OBSERVATION PER HR: HCPCS

## 2021-01-01 PROCEDURE — 99900031 HC PATIENT EDUCATION (STAT)

## 2021-01-01 PROCEDURE — 0002A COVID-19, MRNA, LNP-S, PF, 30 MCG/0.3 ML DOSE VACCINE: ICD-10-PCS | Mod: S$GLB,,, | Performed by: FAMILY MEDICINE

## 2021-01-01 PROCEDURE — 17003 DESTRUCTION, PREMALIGNANT LESIONS; SECOND THROUGH 14 LESIONS: ICD-10-PCS | Mod: S$GLB,,, | Performed by: DERMATOLOGY

## 2021-01-01 PROCEDURE — 63600175 PHARM REV CODE 636 W HCPCS: Performed by: EMERGENCY MEDICINE

## 2021-01-01 PROCEDURE — 85025 COMPLETE CBC W/AUTO DIFF WBC: CPT | Performed by: NURSE PRACTITIONER

## 2021-01-01 PROCEDURE — 92950 HEART/LUNG RESUSCITATION CPR: CPT

## 2021-01-01 PROCEDURE — 80061 LIPID PANEL: CPT | Performed by: NURSE PRACTITIONER

## 2021-01-01 PROCEDURE — 93018 CV STRESS TEST I&R ONLY: CPT | Mod: ,,, | Performed by: INTERNAL MEDICINE

## 2021-01-01 PROCEDURE — 0001A COVID-19, MRNA, LNP-S, PF, 30 MCG/0.3 ML DOSE VACCINE: CPT | Mod: S$GLB,,, | Performed by: FAMILY MEDICINE

## 2021-01-01 PROCEDURE — 91300 COVID-19, MRNA, LNP-S, PF, 30 MCG/0.3 ML DOSE VACCINE: ICD-10-PCS | Mod: S$GLB,,, | Performed by: FAMILY MEDICINE

## 2021-01-01 PROCEDURE — 78452 HT MUSCLE IMAGE SPECT MULT: CPT | Mod: TC

## 2021-01-01 PROCEDURE — 99999 PR PBB SHADOW E&M-EST. PATIENT-LVL III: ICD-10-PCS | Mod: PBBFAC,,, | Performed by: DERMATOLOGY

## 2021-01-01 PROCEDURE — 87086 URINE CULTURE/COLONY COUNT: CPT | Performed by: NURSE PRACTITIONER

## 2021-01-01 PROCEDURE — 36415 COLL VENOUS BLD VENIPUNCTURE: CPT | Performed by: EMERGENCY MEDICINE

## 2021-01-01 PROCEDURE — 82272 OCCULT BLD FECES 1-3 TESTS: CPT | Performed by: NURSE PRACTITIONER

## 2021-01-01 PROCEDURE — 84484 ASSAY OF TROPONIN QUANT: CPT | Mod: 91 | Performed by: EMERGENCY MEDICINE

## 2021-01-01 PROCEDURE — 63600175 PHARM REV CODE 636 W HCPCS

## 2021-01-01 PROCEDURE — 17003 DESTRUCT PREMALG LES 2-14: CPT | Mod: S$GLB,,, | Performed by: DERMATOLOGY

## 2021-01-01 PROCEDURE — 36415 COLL VENOUS BLD VENIPUNCTURE: CPT | Performed by: NURSE PRACTITIONER

## 2021-01-01 PROCEDURE — 93306 TTE W/DOPPLER COMPLETE: CPT

## 2021-01-01 PROCEDURE — 63600175 PHARM REV CODE 636 W HCPCS: Performed by: INTERNAL MEDICINE

## 2021-01-01 PROCEDURE — 83880 ASSAY OF NATRIURETIC PEPTIDE: CPT | Performed by: EMERGENCY MEDICINE

## 2021-01-01 PROCEDURE — 1159F PR MEDICATION LIST DOCUMENTED IN MEDICAL RECORD: ICD-10-PCS | Mod: S$GLB,,, | Performed by: DERMATOLOGY

## 2021-01-01 PROCEDURE — 80053 COMPREHEN METABOLIC PANEL: CPT | Performed by: NURSE PRACTITIONER

## 2021-01-01 PROCEDURE — 85610 PROTHROMBIN TIME: CPT | Performed by: EMERGENCY MEDICINE

## 2021-01-01 PROCEDURE — 87077 CULTURE AEROBIC IDENTIFY: CPT | Performed by: NURSE PRACTITIONER

## 2021-01-01 PROCEDURE — 93306 TTE W/DOPPLER COMPLETE: CPT | Mod: 26,,, | Performed by: INTERNAL MEDICINE

## 2021-01-01 PROCEDURE — 1159F MED LIST DOCD IN RCRD: CPT | Mod: S$GLB,,, | Performed by: DERMATOLOGY

## 2021-01-01 PROCEDURE — 99213 PR OFFICE/OUTPT VISIT, EST, LEVL III, 20-29 MIN: ICD-10-PCS | Mod: 25,S$GLB,, | Performed by: DERMATOLOGY

## 2021-01-01 PROCEDURE — 0002A COVID-19, MRNA, LNP-S, PF, 30 MCG/0.3 ML DOSE VACCINE: CPT | Mod: S$GLB,,, | Performed by: FAMILY MEDICINE

## 2021-01-01 PROCEDURE — 84443 ASSAY THYROID STIM HORMONE: CPT | Performed by: NURSE PRACTITIONER

## 2021-01-01 PROCEDURE — 1126F AMNT PAIN NOTED NONE PRSNT: CPT | Mod: S$GLB,,, | Performed by: DERMATOLOGY

## 2021-01-01 PROCEDURE — 25000003 PHARM REV CODE 250

## 2021-01-01 PROCEDURE — 91300 COVID-19, MRNA, LNP-S, PF, 30 MCG/0.3 ML DOSE VACCINE: CPT | Mod: S$GLB,,, | Performed by: FAMILY MEDICINE

## 2021-01-01 PROCEDURE — 93005 ELECTROCARDIOGRAM TRACING: CPT | Performed by: GENERAL PRACTICE

## 2021-01-01 PROCEDURE — 83036 HEMOGLOBIN GLYCOSYLATED A1C: CPT | Performed by: NURSE PRACTITIONER

## 2021-01-01 PROCEDURE — 99999 PR PBB SHADOW E&M-EST. PATIENT-LVL III: CPT | Mod: PBBFAC,,, | Performed by: DERMATOLOGY

## 2021-01-01 PROCEDURE — 99285 EMERGENCY DEPT VISIT HI MDM: CPT | Mod: 25

## 2021-01-01 PROCEDURE — 93306 ECHO (CUPID ONLY): ICD-10-PCS | Mod: 26,,, | Performed by: INTERNAL MEDICINE

## 2021-01-01 PROCEDURE — 85610 PROTHROMBIN TIME: CPT | Performed by: NURSE PRACTITIONER

## 2021-01-01 PROCEDURE — 0001A COVID-19, MRNA, LNP-S, PF, 30 MCG/0.3 ML DOSE VACCINE: ICD-10-PCS | Mod: S$GLB,,, | Performed by: FAMILY MEDICINE

## 2021-01-01 PROCEDURE — 99900035 HC TECH TIME PER 15 MIN (STAT)

## 2021-01-01 PROCEDURE — 99213 OFFICE O/P EST LOW 20 MIN: CPT | Mod: 25,S$GLB,, | Performed by: DERMATOLOGY

## 2021-01-01 PROCEDURE — A9502 TC99M TETROFOSMIN: HCPCS

## 2021-01-01 PROCEDURE — 93016 CV STRESS TEST SUPVJ ONLY: CPT | Mod: ,,, | Performed by: INTERNAL MEDICINE

## 2021-01-01 PROCEDURE — 17000 DESTRUCT PREMALG LESION: CPT | Mod: S$GLB,,, | Performed by: DERMATOLOGY

## 2021-01-01 PROCEDURE — 1126F PR PAIN SEVERITY QUANTIFIED, NO PAIN PRESENT: ICD-10-PCS | Mod: S$GLB,,, | Performed by: DERMATOLOGY

## 2021-01-01 PROCEDURE — 99220 PR INITIAL OBSERVATION CARE,LEVL III: ICD-10-PCS | Mod: 25,,, | Performed by: INTERNAL MEDICINE

## 2021-01-01 PROCEDURE — 81001 URINALYSIS AUTO W/SCOPE: CPT | Performed by: NURSE PRACTITIONER

## 2021-01-01 PROCEDURE — 83735 ASSAY OF MAGNESIUM: CPT | Performed by: NURSE PRACTITIONER

## 2021-01-01 RX ORDER — WARFARIN 1 MG/1
4 TABLET ORAL
Status: DISCONTINUED | OUTPATIENT
Start: 2021-01-01 | End: 2021-01-01 | Stop reason: HOSPADM

## 2021-01-01 RX ORDER — ATORVASTATIN CALCIUM 20 MG/1
20 TABLET, FILM COATED ORAL DAILY
COMMUNITY

## 2021-01-01 RX ORDER — NITROGLYCERIN 0.4 MG/1
0.4 TABLET SUBLINGUAL EVERY 5 MIN PRN
Status: DISCONTINUED | OUTPATIENT
Start: 2021-01-01 | End: 2021-01-01 | Stop reason: HOSPADM

## 2021-01-01 RX ORDER — ASPIRIN 325 MG
325 TABLET ORAL
Status: ACTIVE | OUTPATIENT
Start: 2021-01-01 | End: 2021-01-01

## 2021-01-01 RX ORDER — REGADENOSON 0.08 MG/ML
0.4 INJECTION, SOLUTION INTRAVENOUS ONCE
Status: COMPLETED | OUTPATIENT
Start: 2021-01-01 | End: 2021-01-01

## 2021-01-01 RX ORDER — EPINEPHRINE 0.1 MG/ML
INJECTION INTRAVENOUS CODE/TRAUMA/SEDATION MEDICATION
Status: COMPLETED | OUTPATIENT
Start: 2021-01-01 | End: 2021-01-01

## 2021-01-01 RX ORDER — SODIUM CHLORIDE 0.9 % (FLUSH) 0.9 %
10 SYRINGE (ML) INJECTION
Status: DISCONTINUED | OUTPATIENT
Start: 2021-01-01 | End: 2021-01-01 | Stop reason: HOSPADM

## 2021-01-01 RX ORDER — SIMVASTATIN 40 MG/1
40 TABLET, FILM COATED ORAL NIGHTLY
Status: DISCONTINUED | OUTPATIENT
Start: 2021-01-01 | End: 2021-01-01 | Stop reason: HOSPADM

## 2021-01-01 RX ORDER — TIMOLOL MALEATE 5 MG/ML
1 SOLUTION/ DROPS OPHTHALMIC DAILY
Status: DISCONTINUED | OUTPATIENT
Start: 2021-01-01 | End: 2021-01-01 | Stop reason: HOSPADM

## 2021-01-01 RX ORDER — ACETAMINOPHEN 325 MG/1
650 TABLET ORAL EVERY 4 HOURS PRN
Status: DISCONTINUED | OUTPATIENT
Start: 2021-01-01 | End: 2021-01-01 | Stop reason: HOSPADM

## 2021-01-01 RX ORDER — LISINOPRIL 2.5 MG/1
2.5 TABLET ORAL DAILY
Status: DISCONTINUED | OUTPATIENT
Start: 2021-01-01 | End: 2021-01-01 | Stop reason: HOSPADM

## 2021-01-01 RX ORDER — NAPROXEN SODIUM 220 MG/1
81 TABLET, FILM COATED ORAL DAILY
Status: DISCONTINUED | OUTPATIENT
Start: 2021-01-01 | End: 2021-01-01

## 2021-01-01 RX ORDER — ONDANSETRON 2 MG/ML
4 INJECTION INTRAMUSCULAR; INTRAVENOUS EVERY 6 HOURS PRN
Status: DISCONTINUED | OUTPATIENT
Start: 2021-01-01 | End: 2021-01-01 | Stop reason: HOSPADM

## 2021-01-01 RX ORDER — TAMSULOSIN HYDROCHLORIDE 0.4 MG/1
1 CAPSULE ORAL DAILY
Status: DISCONTINUED | OUTPATIENT
Start: 2021-01-01 | End: 2021-01-01

## 2021-01-01 RX ORDER — TAMSULOSIN HYDROCHLORIDE 0.4 MG/1
1 CAPSULE ORAL DAILY
Status: DISCONTINUED | OUTPATIENT
Start: 2021-01-01 | End: 2021-01-01 | Stop reason: HOSPADM

## 2021-01-01 RX ORDER — LISINOPRIL 2.5 MG/1
2.5 TABLET ORAL DAILY
Status: DISCONTINUED | OUTPATIENT
Start: 2021-01-01 | End: 2021-01-01

## 2021-01-01 RX ADMIN — SIMVASTATIN 40 MG: 40 TABLET, FILM COATED ORAL at 10:04

## 2021-01-01 RX ADMIN — EPINEPHRINE 1 MG: 0.1 INJECTION, SOLUTION ENDOTRACHEAL; INTRACARDIAC; INTRAVENOUS at 11:07

## 2021-01-01 RX ADMIN — TIMOLOL MALEATE 1 DROP: 5 SOLUTION/ DROPS OPHTHALMIC at 10:04

## 2021-01-01 RX ADMIN — TAMSULOSIN HYDROCHLORIDE 0.4 MG: 0.4 CAPSULE ORAL at 10:04

## 2021-01-01 RX ADMIN — LISINOPRIL 2.5 MG: 2.5 TABLET ORAL at 10:04

## 2021-01-01 RX ADMIN — REGADENOSON 0.4 MG: 0.08 INJECTION, SOLUTION INTRAVENOUS at 09:04

## 2021-04-29 PROBLEM — R55 NEAR SYNCOPE: Status: ACTIVE | Noted: 2021-01-01

## 2021-08-19 DIAGNOSIS — Z86.718 HISTORY OF DVT (DEEP VEIN THROMBOSIS): ICD-10-CM

## 2021-08-19 RX ORDER — WARFARIN 1 MG/1
TABLET ORAL
Qty: 45 TABLET | Refills: 2 | OUTPATIENT
Start: 2021-08-19